# Patient Record
Sex: MALE | Race: WHITE | NOT HISPANIC OR LATINO
[De-identification: names, ages, dates, MRNs, and addresses within clinical notes are randomized per-mention and may not be internally consistent; named-entity substitution may affect disease eponyms.]

---

## 2018-01-31 PROBLEM — Z00.00 ENCOUNTER FOR PREVENTIVE HEALTH EXAMINATION: Status: ACTIVE | Noted: 2018-01-31

## 2018-02-05 ENCOUNTER — APPOINTMENT (OUTPATIENT)
Dept: CT IMAGING | Facility: CLINIC | Age: 58
End: 2018-02-05
Payer: COMMERCIAL

## 2018-02-05 ENCOUNTER — OUTPATIENT (OUTPATIENT)
Dept: OUTPATIENT SERVICES | Facility: HOSPITAL | Age: 58
LOS: 1 days | End: 2018-02-05
Payer: COMMERCIAL

## 2018-02-05 DIAGNOSIS — Z00.8 ENCOUNTER FOR OTHER GENERAL EXAMINATION: ICD-10-CM

## 2018-02-05 PROCEDURE — 71260 CT THORAX DX C+: CPT

## 2018-02-05 PROCEDURE — 82565 ASSAY OF CREATININE: CPT

## 2018-02-05 PROCEDURE — 74177 CT ABD & PELVIS W/CONTRAST: CPT | Mod: 26

## 2018-02-05 PROCEDURE — 71260 CT THORAX DX C+: CPT | Mod: 26

## 2018-02-05 PROCEDURE — 74177 CT ABD & PELVIS W/CONTRAST: CPT

## 2022-10-08 ENCOUNTER — INPATIENT (INPATIENT)
Facility: HOSPITAL | Age: 62
LOS: 4 days | Discharge: HOME | DRG: 682 | End: 2022-10-13
Attending: INTERNAL MEDICINE | Admitting: INTERNAL MEDICINE
Payer: COMMERCIAL

## 2022-10-08 VITALS
RESPIRATION RATE: 18 BRPM | SYSTOLIC BLOOD PRESSURE: 106 MMHG | HEART RATE: 99 BPM | HEIGHT: 66 IN | TEMPERATURE: 97 F | WEIGHT: 162.92 LBS | DIASTOLIC BLOOD PRESSURE: 67 MMHG | OXYGEN SATURATION: 93 %

## 2022-10-08 DIAGNOSIS — N17.9 ACUTE KIDNEY FAILURE, UNSPECIFIED: ICD-10-CM

## 2022-10-08 LAB
ACETONE SERPL-MCNC: NEGATIVE — SIGNIFICANT CHANGE UP
ALBUMIN SERPL ELPH-MCNC: 2.3 G/DL — LOW (ref 3.3–5)
ALBUMIN SERPL ELPH-MCNC: 2.7 G/DL — LOW (ref 3.3–5)
ALP SERPL-CCNC: 68 U/L — SIGNIFICANT CHANGE UP (ref 40–120)
ALP SERPL-CCNC: 77 U/L — SIGNIFICANT CHANGE UP (ref 40–120)
ALT FLD-CCNC: 81 U/L — HIGH (ref 12–78)
ALT FLD-CCNC: 84 U/L — HIGH (ref 12–78)
ANION GAP SERPL CALC-SCNC: 10 MMOL/L — SIGNIFICANT CHANGE UP (ref 5–17)
ANION GAP SERPL CALC-SCNC: 10 MMOL/L — SIGNIFICANT CHANGE UP (ref 5–17)
ANION GAP SERPL CALC-SCNC: 14 MMOL/L — SIGNIFICANT CHANGE UP (ref 5–17)
APPEARANCE UR: CLEAR — SIGNIFICANT CHANGE UP
AST SERPL-CCNC: 56 U/L — HIGH (ref 15–37)
AST SERPL-CCNC: 65 U/L — HIGH (ref 15–37)
BASOPHILS # BLD AUTO: 0.03 K/UL — SIGNIFICANT CHANGE UP (ref 0–0.2)
BASOPHILS NFR BLD AUTO: 0.4 % — SIGNIFICANT CHANGE UP (ref 0–2)
BILIRUB DIRECT SERPL-MCNC: 0.2 MG/DL — SIGNIFICANT CHANGE UP (ref 0–0.3)
BILIRUB INDIRECT FLD-MCNC: 0.4 MG/DL — SIGNIFICANT CHANGE UP (ref 0.2–1)
BILIRUB SERPL-MCNC: 0.6 MG/DL — SIGNIFICANT CHANGE UP (ref 0.2–1.2)
BILIRUB SERPL-MCNC: 0.7 MG/DL — SIGNIFICANT CHANGE UP (ref 0.2–1.2)
BILIRUB UR-MCNC: NEGATIVE — SIGNIFICANT CHANGE UP
BUN SERPL-MCNC: 65 MG/DL — HIGH (ref 7–23)
BUN SERPL-MCNC: 65 MG/DL — HIGH (ref 7–23)
BUN SERPL-MCNC: 70 MG/DL — HIGH (ref 7–23)
CALCIUM SERPL-MCNC: 8.9 MG/DL — SIGNIFICANT CHANGE UP (ref 8.5–10.1)
CALCIUM SERPL-MCNC: 9 MG/DL — SIGNIFICANT CHANGE UP (ref 8.5–10.1)
CALCIUM SERPL-MCNC: 9.7 MG/DL — SIGNIFICANT CHANGE UP (ref 8.5–10.1)
CHLORIDE SERPL-SCNC: 103 MMOL/L — SIGNIFICANT CHANGE UP (ref 96–108)
CHLORIDE SERPL-SCNC: 104 MMOL/L — SIGNIFICANT CHANGE UP (ref 96–108)
CHLORIDE SERPL-SCNC: 95 MMOL/L — LOW (ref 96–108)
CO2 SERPL-SCNC: 21 MMOL/L — LOW (ref 22–31)
CO2 SERPL-SCNC: 21 MMOL/L — LOW (ref 22–31)
CO2 SERPL-SCNC: 22 MMOL/L — SIGNIFICANT CHANGE UP (ref 22–31)
COLOR SPEC: YELLOW — SIGNIFICANT CHANGE UP
CREAT SERPL-MCNC: 3.3 MG/DL — HIGH (ref 0.5–1.3)
CREAT SERPL-MCNC: 3.4 MG/DL — HIGH (ref 0.5–1.3)
CREAT SERPL-MCNC: 3.9 MG/DL — HIGH (ref 0.5–1.3)
DIFF PNL FLD: ABNORMAL
EGFR: 17 ML/MIN/1.73M2 — LOW
EGFR: 20 ML/MIN/1.73M2 — LOW
EGFR: 21 ML/MIN/1.73M2 — LOW
EOSINOPHIL # BLD AUTO: 0.07 K/UL — SIGNIFICANT CHANGE UP (ref 0–0.5)
EOSINOPHIL NFR BLD AUTO: 1 % — SIGNIFICANT CHANGE UP (ref 0–6)
ETHANOL SERPL-MCNC: <10 MG/DL — SIGNIFICANT CHANGE UP (ref 0–10)
GLUCOSE SERPL-MCNC: 227 MG/DL — HIGH (ref 70–99)
GLUCOSE SERPL-MCNC: 229 MG/DL — HIGH (ref 70–99)
GLUCOSE SERPL-MCNC: 328 MG/DL — HIGH (ref 70–99)
GLUCOSE UR QL: 1000 MG/DL
HCT VFR BLD CALC: 39.9 % — SIGNIFICANT CHANGE UP (ref 39–50)
HGB BLD-MCNC: 13.5 G/DL — SIGNIFICANT CHANGE UP (ref 13–17)
IMM GRANULOCYTES NFR BLD AUTO: 0.6 % — SIGNIFICANT CHANGE UP (ref 0–0.9)
KETONES UR-MCNC: NEGATIVE — SIGNIFICANT CHANGE UP
LACTATE SERPL-SCNC: 1.5 MMOL/L — SIGNIFICANT CHANGE UP (ref 0.7–2)
LEUKOCYTE ESTERASE UR-ACNC: NEGATIVE — SIGNIFICANT CHANGE UP
LYMPHOCYTES # BLD AUTO: 0.5 K/UL — LOW (ref 1–3.3)
LYMPHOCYTES # BLD AUTO: 7.2 % — LOW (ref 13–44)
MAGNESIUM SERPL-MCNC: 2.2 MG/DL — SIGNIFICANT CHANGE UP (ref 1.6–2.6)
MCHC RBC-ENTMCNC: 29.8 PG — SIGNIFICANT CHANGE UP (ref 27–34)
MCHC RBC-ENTMCNC: 33.8 GM/DL — SIGNIFICANT CHANGE UP (ref 32–36)
MCV RBC AUTO: 88.1 FL — SIGNIFICANT CHANGE UP (ref 80–100)
MONOCYTES # BLD AUTO: 1.04 K/UL — HIGH (ref 0–0.9)
MONOCYTES NFR BLD AUTO: 15 % — HIGH (ref 2–14)
NEUTROPHILS # BLD AUTO: 5.25 K/UL — SIGNIFICANT CHANGE UP (ref 1.8–7.4)
NEUTROPHILS NFR BLD AUTO: 75.8 % — SIGNIFICANT CHANGE UP (ref 43–77)
NITRITE UR-MCNC: NEGATIVE — SIGNIFICANT CHANGE UP
NRBC # BLD: 0 /100 WBCS — SIGNIFICANT CHANGE UP (ref 0–0)
PH UR: 5 — SIGNIFICANT CHANGE UP (ref 5–8)
PHOSPHATE SERPL-MCNC: 3.1 MG/DL — SIGNIFICANT CHANGE UP (ref 2.5–4.5)
PLATELET # BLD AUTO: 263 K/UL — SIGNIFICANT CHANGE UP (ref 150–400)
POTASSIUM SERPL-MCNC: 3.9 MMOL/L — SIGNIFICANT CHANGE UP (ref 3.5–5.3)
POTASSIUM SERPL-MCNC: 4.4 MMOL/L — SIGNIFICANT CHANGE UP (ref 3.5–5.3)
POTASSIUM SERPL-MCNC: 4.5 MMOL/L — SIGNIFICANT CHANGE UP (ref 3.5–5.3)
POTASSIUM SERPL-SCNC: 3.9 MMOL/L — SIGNIFICANT CHANGE UP (ref 3.5–5.3)
POTASSIUM SERPL-SCNC: 4.4 MMOL/L — SIGNIFICANT CHANGE UP (ref 3.5–5.3)
POTASSIUM SERPL-SCNC: 4.5 MMOL/L — SIGNIFICANT CHANGE UP (ref 3.5–5.3)
PROT SERPL-MCNC: 6.5 G/DL — SIGNIFICANT CHANGE UP (ref 6–8.3)
PROT SERPL-MCNC: 7.8 G/DL — SIGNIFICANT CHANGE UP (ref 6–8.3)
PROT UR-MCNC: 100
RBC # BLD: 4.53 M/UL — SIGNIFICANT CHANGE UP (ref 4.2–5.8)
RBC # FLD: 13.6 % — SIGNIFICANT CHANGE UP (ref 10.3–14.5)
SARS-COV-2 RNA SPEC QL NAA+PROBE: SIGNIFICANT CHANGE UP
SODIUM SERPL-SCNC: 131 MMOL/L — LOW (ref 135–145)
SODIUM SERPL-SCNC: 134 MMOL/L — LOW (ref 135–145)
SODIUM SERPL-SCNC: 135 MMOL/L — SIGNIFICANT CHANGE UP (ref 135–145)
SP GR SPEC: 1.01 — SIGNIFICANT CHANGE UP (ref 1.01–1.02)
UROBILINOGEN FLD QL: NEGATIVE — SIGNIFICANT CHANGE UP
WBC # BLD: 6.93 K/UL — SIGNIFICANT CHANGE UP (ref 3.8–10.5)
WBC # FLD AUTO: 6.93 K/UL — SIGNIFICANT CHANGE UP (ref 3.8–10.5)

## 2022-10-08 PROCEDURE — 93010 ELECTROCARDIOGRAM REPORT: CPT

## 2022-10-08 PROCEDURE — 74176 CT ABD & PELVIS W/O CONTRAST: CPT | Mod: 26,MA

## 2022-10-08 PROCEDURE — 71250 CT THORAX DX C-: CPT | Mod: 26

## 2022-10-08 PROCEDURE — 99285 EMERGENCY DEPT VISIT HI MDM: CPT

## 2022-10-08 PROCEDURE — 70450 CT HEAD/BRAIN W/O DYE: CPT | Mod: 26,MA

## 2022-10-08 PROCEDURE — 71045 X-RAY EXAM CHEST 1 VIEW: CPT | Mod: 26

## 2022-10-08 RX ORDER — THIAMINE MONONITRATE (VIT B1) 100 MG
100 TABLET ORAL DAILY
Refills: 0 | Status: COMPLETED | OUTPATIENT
Start: 2022-10-08 | End: 2022-10-11

## 2022-10-08 RX ORDER — PIPERACILLIN AND TAZOBACTAM 4; .5 G/20ML; G/20ML
3.38 INJECTION, POWDER, LYOPHILIZED, FOR SOLUTION INTRAVENOUS EVERY 12 HOURS
Refills: 0 | Status: DISCONTINUED | OUTPATIENT
Start: 2022-10-08 | End: 2022-10-10

## 2022-10-08 RX ORDER — HEPARIN SODIUM 5000 [USP'U]/ML
5000 INJECTION INTRAVENOUS; SUBCUTANEOUS EVERY 8 HOURS
Refills: 0 | Status: DISCONTINUED | OUTPATIENT
Start: 2022-10-08 | End: 2022-10-13

## 2022-10-08 RX ORDER — SODIUM CHLORIDE 9 MG/ML
1000 INJECTION, SOLUTION INTRAVENOUS
Refills: 0 | Status: DISCONTINUED | OUTPATIENT
Start: 2022-10-08 | End: 2022-10-13

## 2022-10-08 RX ORDER — GLUCAGON INJECTION, SOLUTION 0.5 MG/.1ML
1 INJECTION, SOLUTION SUBCUTANEOUS ONCE
Refills: 0 | Status: DISCONTINUED | OUTPATIENT
Start: 2022-10-08 | End: 2022-10-13

## 2022-10-08 RX ORDER — METOPROLOL TARTRATE 50 MG
25 TABLET ORAL
Refills: 0 | Status: DISCONTINUED | OUTPATIENT
Start: 2022-10-08 | End: 2022-10-13

## 2022-10-08 RX ORDER — FOLIC ACID 0.8 MG
1 TABLET ORAL DAILY
Refills: 0 | Status: DISCONTINUED | OUTPATIENT
Start: 2022-10-08 | End: 2022-10-13

## 2022-10-08 RX ORDER — SODIUM CHLORIDE 9 MG/ML
1000 INJECTION INTRAMUSCULAR; INTRAVENOUS; SUBCUTANEOUS ONCE
Refills: 0 | Status: COMPLETED | OUTPATIENT
Start: 2022-10-08 | End: 2022-10-08

## 2022-10-08 RX ORDER — AMLODIPINE BESYLATE 2.5 MG/1
5 TABLET ORAL DAILY
Refills: 0 | Status: DISCONTINUED | OUTPATIENT
Start: 2022-10-08 | End: 2022-10-13

## 2022-10-08 RX ORDER — SODIUM CHLORIDE 9 MG/ML
1000 INJECTION INTRAMUSCULAR; INTRAVENOUS; SUBCUTANEOUS
Refills: 0 | Status: DISCONTINUED | OUTPATIENT
Start: 2022-10-08 | End: 2022-10-11

## 2022-10-08 RX ORDER — INSULIN GLARGINE 100 [IU]/ML
10 INJECTION, SOLUTION SUBCUTANEOUS ONCE
Refills: 0 | Status: COMPLETED | OUTPATIENT
Start: 2022-10-08 | End: 2022-10-08

## 2022-10-08 RX ORDER — DEXTROSE 50 % IN WATER 50 %
12.5 SYRINGE (ML) INTRAVENOUS ONCE
Refills: 0 | Status: DISCONTINUED | OUTPATIENT
Start: 2022-10-08 | End: 2022-10-13

## 2022-10-08 RX ORDER — DEXTROSE 50 % IN WATER 50 %
25 SYRINGE (ML) INTRAVENOUS ONCE
Refills: 0 | Status: DISCONTINUED | OUTPATIENT
Start: 2022-10-08 | End: 2022-10-13

## 2022-10-08 RX ORDER — INSULIN LISPRO 100/ML
VIAL (ML) SUBCUTANEOUS
Refills: 0 | Status: DISCONTINUED | OUTPATIENT
Start: 2022-10-08 | End: 2022-10-11

## 2022-10-08 RX ORDER — DEXTROSE 50 % IN WATER 50 %
15 SYRINGE (ML) INTRAVENOUS ONCE
Refills: 0 | Status: DISCONTINUED | OUTPATIENT
Start: 2022-10-08 | End: 2022-10-13

## 2022-10-08 RX ORDER — ATORVASTATIN CALCIUM 80 MG/1
20 TABLET, FILM COATED ORAL AT BEDTIME
Refills: 0 | Status: DISCONTINUED | OUTPATIENT
Start: 2022-10-08 | End: 2022-10-13

## 2022-10-08 RX ORDER — PIPERACILLIN AND TAZOBACTAM 4; .5 G/20ML; G/20ML
3.38 INJECTION, POWDER, LYOPHILIZED, FOR SOLUTION INTRAVENOUS ONCE
Refills: 0 | Status: COMPLETED | OUTPATIENT
Start: 2022-10-08 | End: 2022-10-08

## 2022-10-08 RX ADMIN — ATORVASTATIN CALCIUM 20 MILLIGRAM(S): 80 TABLET, FILM COATED ORAL at 22:54

## 2022-10-08 RX ADMIN — INSULIN GLARGINE 10 UNIT(S): 100 INJECTION, SOLUTION SUBCUTANEOUS at 22:54

## 2022-10-08 RX ADMIN — PIPERACILLIN AND TAZOBACTAM 200 GRAM(S): 4; .5 INJECTION, POWDER, LYOPHILIZED, FOR SOLUTION INTRAVENOUS at 19:40

## 2022-10-08 RX ADMIN — SODIUM CHLORIDE 1000 MILLILITER(S): 9 INJECTION INTRAMUSCULAR; INTRAVENOUS; SUBCUTANEOUS at 12:32

## 2022-10-08 RX ADMIN — HEPARIN SODIUM 5000 UNIT(S): 5000 INJECTION INTRAVENOUS; SUBCUTANEOUS at 22:54

## 2022-10-08 RX ADMIN — SODIUM CHLORIDE 1000 MILLILITER(S): 9 INJECTION INTRAMUSCULAR; INTRAVENOUS; SUBCUTANEOUS at 13:32

## 2022-10-08 RX ADMIN — Medication 2: at 22:54

## 2022-10-08 NOTE — ED PROVIDER NOTE - OBJECTIVE STATEMENT
Pt is a 62-year-old male with past medical hx of diabetes hypertension hyperlipidemia here for elevated blood sugar for the last few days.  Wife states patient also has been more confused but only at night.  Patient also noted to have shaking and tremors throughout the day.  Patient denies any cough fever chills numbness tingling or weakness no trauma or falls.  Of note patient quit drinking wine 1 week ago usually drinks about 3 to 4 glasses of homemade wine.  Patient denies any chest pain shortness of breath nausea vomiting.  Patient states had diarrhea few days ago now resolved.

## 2022-10-08 NOTE — H&P ADULT - HISTORY OF PRESENT ILLNESS
62y Male complaining of altered mental status. 62-year-old male with past medical hx of diabetes hypertension hyperlipidemia here for elevated blood sugar for the last few days.  Wife states patient also has been more confused but only at night.  Patient also noted to have shaking and tremors throughout the day.  Patient denies any cough fever chills numbness tingling or weakness no trauma or falls.  Of note patient quit drinking wine 1 week ago usually drinks about 3 to 4 glasses of homemade wine.  Patient denies any chest pain shortness of breath nausea vomiting.  Patient states had diarrhea few days ago now resolved. 62-year-old male with past medical hx of T2 diabetes hypertension hyperlipidemia here for elevated blood sugar for the last few days.  Wife states patient also has been more confused but only at night.  Patient also noted to have shaking and tremors throughout the day.  Patient denies any cough fever chills numbness tingling or weakness no trauma or falls.  Of note patient quit drinking wine 1 week ago usually drinks about 3 to 4 glasses of homemade wine.  Patient denies any chest pain shortness of breath nausea vomiting.  Patient states had diarrhea few days ago now resolved.

## 2022-10-08 NOTE — ED PROVIDER NOTE - NS ED ATTENDING STATEMENT MOD
This was a shared visit with the RICK. I reviewed and verified the documentation and independently performed the documented:

## 2022-10-08 NOTE — CONSULT NOTE ADULT - ASSESSMENT
[facility]  Chart Reviewed-Full Consult to follow for any immediate concerns please fell free to contact us directly at  [phone] and have us paged or text my cell # [phone]  Daron Bañuelos MD PhD

## 2022-10-08 NOTE — H&P ADULT - NSHPLABSRESULTS_GEN_ALL_CORE
Lab Results:  CBC  CBC Full  -  ( 08 Oct 2022 12:25 )  WBC Count : 6.93 K/uL  RBC Count : 4.53 M/uL  Hemoglobin : 13.5 g/dL  Hematocrit : 39.9 %  Platelet Count - Automated : 263 K/uL  Mean Cell Volume : 88.1 fl  Mean Cell Hemoglobin : 29.8 pg  Mean Cell Hemoglobin Concentration : 33.8 gm/dL  Auto Neutrophil # : 5.25 K/uL  Auto Lymphocyte # : 0.50 K/uL  Auto Monocyte # : 1.04 K/uL  Auto Eosinophil # : 0.07 K/uL  Auto Basophil # : 0.03 K/uL  Auto Neutrophil % : 75.8 %  Auto Lymphocyte % : 7.2 %  Auto Monocyte % : 15.0 %  Auto Eosinophil % : 1.0 %  Auto Basophil % : 0.4 %    .		Differential:	[] Automated		[] Manual  Chemistry                        13.5   6.93  )-----------( 263      ( 08 Oct 2022 12:25 )             39.9     10-08    131<L>  |  95<L>  |  70<H>  ----------------------------<  328<H>  3.9   |  22  |  3.90<H>    Ca    9.7      08 Oct 2022 12:25    TPro  7.8  /  Alb  2.7<L>  /  TBili  0.7  /  DBili  x   /  AST  56<H>  /  ALT  84<H>  /  AlkPhos  77  10-08    LIVER FUNCTIONS - ( 08 Oct 2022 12:25 )  Alb: 2.7 g/dL / Pro: 7.8 g/dL / ALK PHOS: 77 U/L / ALT: 84 U/L / AST: 56 U/L / GGT: x             Urinalysis Basic - ( 08 Oct 2022 15:00 )    Color: Yellow / Appearance: Clear / S.010 / pH: x  Gluc: x / Ketone: Negative  / Bili: Negative / Urobili: Negative   Blood: x / Protein: 100 / Nitrite: Negative   Leuk Esterase: Negative / RBC: 3-5 /HPF / WBC 0-2   Sq Epi: x / Non Sq Epi: Occasional / Bacteria: x            MICROBIOLOGY/CULTURES:      RADIOLOGY RESULTS: reviewed

## 2022-10-08 NOTE — H&P ADULT - ASSESSMENT
62-year-old male with past medical hx of diabetes hypertension hyperlipidemia here for elevated blood sugar for the last few days.  Wife states patient also has been more confused but only at night.  Patient also noted to have shaking and tremors throughout the day.  Patient denies any cough fever chills numbness tingling or weakness no trauma or falls.  Of note patient quit drinking wine 1 week ago usually drinks about 3 to 4 glasses of homemade wine.  Patient denies any chest pain shortness of breath nausea vomiting.  Patient states had diarrhea few days ago now resolved.    1 PNA  - cw abx  - id fu   - will monitor    2 Uncontrolled dm  - ro DKA  - endo fu pending   - monitor FS  - ISS    3 HTN  - cw home meds  - dash diet    4 HLD  - cw statin    5 Alcohol abuse  - stopped drinking  - [facility] protocol  - low risk of withdrawal

## 2022-10-08 NOTE — H&P ADULT - NSHPPHYSICALEXAM_GEN_ALL_CORE
General: WN/WD NAD  PERRLA  Neurology: A&Ox3, nonfocal, MOROCHO x 4  Respiratory: CTA B/L  CV: RRR, S1S2, no murmurs, rubs or gallops  Abdominal: Soft, NT, ND +BS, Last BM  Extremities: No edema, + peripheral pulses  Skin Normal

## 2022-10-08 NOTE — ED ADULT NURSE NOTE - OBJECTIVE STATEMENT
Presents to ER with AMS x 1 week.  Wife at bedside stating pt has been confused and hallucinating at night time. Rectal temp 99. Wife states pt drinks ETOH daily but has recently stopped bc he wasn't feeling well.

## 2022-10-08 NOTE — ED ADULT TRIAGE NOTE - CHIEF COMPLAINT QUOTE
Patient having altered mental status since last week only at nighttime. Per family members, patient had diarrhea x3 days last week which has now subsided but now patient c.o lower back pain.

## 2022-10-08 NOTE — ED ADULT NURSE NOTE - NURSING NEURO LEVEL OF CONSCIOUSNESS
alert and awake Rhofade Counseling: Rhofade is a topical medication which can decrease superficial blood flow where applied. Side effects are uncommon and include stinging, redness and allergic reactions.

## 2022-10-08 NOTE — ED ADULT NURSE NOTE - SCORE
A (Catheter 5fr Radl Tig 4 Crv 110cm Lg Lum Sh 2) catheter was used to cross the aortic valve and placed in the left ventricle.  LV pressure was recorded and measured.  1

## 2022-10-08 NOTE — ED PROVIDER NOTE - CLINICAL SUMMARY MEDICAL DECISION MAKING FREE TEXT BOX
I, True Mckee MD, have seen and examined the patient on the date of service.  I agree with the RICK's assessment as written, with exceptions or additions as noted below or in a separate note. Patient with past medical history of alcohol use is presenting with concern for further mental status.  Wife has noticed that since Tuesday that he has been having change in mental status at nighttime but improves over the course of the day.  Of note he has a few glasses of wine daily, however stopped drinking on Monday.  Has not been having any increased shaking episodes or reported seizures.  At this time patient found to be hyperglycemic but otherwise had normal mental status per discussion with wife at bedside.  No focal findings on exam.  Concern for possible alcohol withdrawal, though with symptoms only occurring at night and not worsening, less likely that.  Will check for underlying electrolyte abnormality versus possible DKA.  No fever to suggest underlying infection.  plan to check labs/imaging.

## 2022-10-09 DIAGNOSIS — E11.65 TYPE 2 DIABETES MELLITUS WITH HYPERGLYCEMIA: ICD-10-CM

## 2022-10-09 LAB
A1C WITH ESTIMATED AVERAGE GLUCOSE RESULT: 8.8 % — HIGH (ref 4–5.6)
ALBUMIN SERPL ELPH-MCNC: 2.3 G/DL — LOW (ref 3.3–5)
ALP SERPL-CCNC: 71 U/L — SIGNIFICANT CHANGE UP (ref 40–120)
ALT FLD-CCNC: 89 U/L — HIGH (ref 12–78)
ANION GAP SERPL CALC-SCNC: 10 MMOL/L — SIGNIFICANT CHANGE UP (ref 5–17)
ANION GAP SERPL CALC-SCNC: 10 MMOL/L — SIGNIFICANT CHANGE UP (ref 5–17)
AST SERPL-CCNC: 72 U/L — HIGH (ref 15–37)
B-OH-BUTYR SERPL-SCNC: 0.6 MMOL/L — HIGH
BASE EXCESS BLDV CALC-SCNC: -4.5 MMOL/L — LOW (ref -2–3)
BILIRUB SERPL-MCNC: 0.7 MG/DL — SIGNIFICANT CHANGE UP (ref 0.2–1.2)
BLOOD GAS COMMENTS, VENOUS: SIGNIFICANT CHANGE UP
BUN SERPL-MCNC: 62 MG/DL — HIGH (ref 7–23)
BUN SERPL-MCNC: 65 MG/DL — HIGH (ref 7–23)
CALCIUM SERPL-MCNC: 9 MG/DL — SIGNIFICANT CHANGE UP (ref 8.5–10.1)
CALCIUM SERPL-MCNC: 9.2 MG/DL — SIGNIFICANT CHANGE UP (ref 8.5–10.1)
CHLORIDE SERPL-SCNC: 104 MMOL/L — SIGNIFICANT CHANGE UP (ref 96–108)
CHLORIDE SERPL-SCNC: 104 MMOL/L — SIGNIFICANT CHANGE UP (ref 96–108)
CO2 SERPL-SCNC: 23 MMOL/L — SIGNIFICANT CHANGE UP (ref 22–31)
CO2 SERPL-SCNC: 23 MMOL/L — SIGNIFICANT CHANGE UP (ref 22–31)
CREAT SERPL-MCNC: 3.4 MG/DL — HIGH (ref 0.5–1.3)
CREAT SERPL-MCNC: 3.4 MG/DL — HIGH (ref 0.5–1.3)
CULTURE RESULTS: SIGNIFICANT CHANGE UP
EGFR: 20 ML/MIN/1.73M2 — LOW
EGFR: 20 ML/MIN/1.73M2 — LOW
ESTIMATED AVERAGE GLUCOSE: 206 MG/DL — HIGH (ref 68–114)
GLUCOSE SERPL-MCNC: 181 MG/DL — HIGH (ref 70–99)
GLUCOSE SERPL-MCNC: 193 MG/DL — HIGH (ref 70–99)
HCO3 BLDV-SCNC: 22 MMOL/L — SIGNIFICANT CHANGE UP (ref 22–29)
HCT VFR BLD CALC: 36.2 % — LOW (ref 39–50)
HCV AB S/CO SERPL IA: 0.08 S/CO — SIGNIFICANT CHANGE UP (ref 0–0.99)
HCV AB SERPL-IMP: SIGNIFICANT CHANGE UP
HGB BLD-MCNC: 12.1 G/DL — LOW (ref 13–17)
MAGNESIUM SERPL-MCNC: 2.3 MG/DL — SIGNIFICANT CHANGE UP (ref 1.6–2.6)
MCHC RBC-ENTMCNC: 29.9 PG — SIGNIFICANT CHANGE UP (ref 27–34)
MCHC RBC-ENTMCNC: 33.4 GM/DL — SIGNIFICANT CHANGE UP (ref 32–36)
MCV RBC AUTO: 89.4 FL — SIGNIFICANT CHANGE UP (ref 80–100)
NRBC # BLD: 0 /100 WBCS — SIGNIFICANT CHANGE UP (ref 0–0)
PCO2 BLDV: 42 MMHG — SIGNIFICANT CHANGE UP (ref 42–55)
PH BLDV: 7.32 — SIGNIFICANT CHANGE UP (ref 7.32–7.43)
PHOSPHATE SERPL-MCNC: 3 MG/DL — SIGNIFICANT CHANGE UP (ref 2.5–4.5)
PLATELET # BLD AUTO: 282 K/UL — SIGNIFICANT CHANGE UP (ref 150–400)
PO2 BLDV: 53 MMHG — HIGH (ref 25–45)
POTASSIUM SERPL-MCNC: 4.4 MMOL/L — SIGNIFICANT CHANGE UP (ref 3.5–5.3)
POTASSIUM SERPL-MCNC: 4.4 MMOL/L — SIGNIFICANT CHANGE UP (ref 3.5–5.3)
POTASSIUM SERPL-SCNC: 4.4 MMOL/L — SIGNIFICANT CHANGE UP (ref 3.5–5.3)
POTASSIUM SERPL-SCNC: 4.4 MMOL/L — SIGNIFICANT CHANGE UP (ref 3.5–5.3)
PROT SERPL-MCNC: 6.6 G/DL — SIGNIFICANT CHANGE UP (ref 6–8.3)
RBC # BLD: 4.05 M/UL — LOW (ref 4.2–5.8)
RBC # FLD: 13.7 % — SIGNIFICANT CHANGE UP (ref 10.3–14.5)
SAO2 % BLDV: 85.3 % — SIGNIFICANT CHANGE UP (ref 67–88)
SODIUM SERPL-SCNC: 137 MMOL/L — SIGNIFICANT CHANGE UP (ref 135–145)
SODIUM SERPL-SCNC: 137 MMOL/L — SIGNIFICANT CHANGE UP (ref 135–145)
SPECIMEN SOURCE: SIGNIFICANT CHANGE UP
WBC # BLD: 6.6 K/UL — SIGNIFICANT CHANGE UP (ref 3.8–10.5)
WBC # FLD AUTO: 6.6 K/UL — SIGNIFICANT CHANGE UP (ref 3.8–10.5)

## 2022-10-09 PROCEDURE — 76770 US EXAM ABDO BACK WALL COMP: CPT | Mod: 26

## 2022-10-09 RX ORDER — INSULIN LISPRO 100/ML
VIAL (ML) SUBCUTANEOUS
Refills: 0 | Status: DISCONTINUED | OUTPATIENT
Start: 2022-10-09 | End: 2022-10-11

## 2022-10-09 RX ORDER — INSULIN GLARGINE 100 [IU]/ML
10 INJECTION, SOLUTION SUBCUTANEOUS AT BEDTIME
Refills: 0 | Status: DISCONTINUED | OUTPATIENT
Start: 2022-10-09 | End: 2022-10-13

## 2022-10-09 RX ORDER — INSULIN LISPRO 100/ML
VIAL (ML) SUBCUTANEOUS AT BEDTIME
Refills: 0 | Status: DISCONTINUED | OUTPATIENT
Start: 2022-10-09 | End: 2022-10-13

## 2022-10-09 RX ORDER — SODIUM CHLORIDE 9 MG/ML
1000 INJECTION, SOLUTION INTRAVENOUS
Refills: 0 | Status: DISCONTINUED | OUTPATIENT
Start: 2022-10-09 | End: 2022-10-13

## 2022-10-09 RX ORDER — INSULIN LISPRO 100/ML
3 VIAL (ML) SUBCUTANEOUS
Refills: 0 | Status: DISCONTINUED | OUTPATIENT
Start: 2022-10-09 | End: 2022-10-13

## 2022-10-09 RX ORDER — ALBUTEROL 90 UG/1
2 AEROSOL, METERED ORAL EVERY 6 HOURS
Refills: 0 | Status: DISCONTINUED | OUTPATIENT
Start: 2022-10-09 | End: 2022-10-13

## 2022-10-09 RX ADMIN — HEPARIN SODIUM 5000 UNIT(S): 5000 INJECTION INTRAVENOUS; SUBCUTANEOUS at 14:18

## 2022-10-09 RX ADMIN — Medication 100 MILLIGRAM(S): at 12:51

## 2022-10-09 RX ADMIN — Medication 3 UNIT(S): at 19:37

## 2022-10-09 RX ADMIN — SODIUM CHLORIDE 75 MILLILITER(S): 9 INJECTION INTRAMUSCULAR; INTRAVENOUS; SUBCUTANEOUS at 00:41

## 2022-10-09 RX ADMIN — Medication 2: at 12:55

## 2022-10-09 RX ADMIN — HEPARIN SODIUM 5000 UNIT(S): 5000 INJECTION INTRAVENOUS; SUBCUTANEOUS at 22:12

## 2022-10-09 RX ADMIN — PIPERACILLIN AND TAZOBACTAM 25 GRAM(S): 4; .5 INJECTION, POWDER, LYOPHILIZED, FOR SOLUTION INTRAVENOUS at 03:45

## 2022-10-09 RX ADMIN — Medication 1: at 06:45

## 2022-10-09 RX ADMIN — HEPARIN SODIUM 5000 UNIT(S): 5000 INJECTION INTRAVENOUS; SUBCUTANEOUS at 06:44

## 2022-10-09 RX ADMIN — ATORVASTATIN CALCIUM 20 MILLIGRAM(S): 80 TABLET, FILM COATED ORAL at 22:13

## 2022-10-09 RX ADMIN — INSULIN GLARGINE 10 UNIT(S): 100 INJECTION, SOLUTION SUBCUTANEOUS at 22:13

## 2022-10-09 RX ADMIN — AMLODIPINE BESYLATE 5 MILLIGRAM(S): 2.5 TABLET ORAL at 06:44

## 2022-10-09 RX ADMIN — Medication 25 MILLIGRAM(S): at 06:45

## 2022-10-09 RX ADMIN — Medication 2: at 19:37

## 2022-10-09 RX ADMIN — PIPERACILLIN AND TAZOBACTAM 25 GRAM(S): 4; .5 INJECTION, POWDER, LYOPHILIZED, FOR SOLUTION INTRAVENOUS at 14:18

## 2022-10-09 RX ADMIN — Medication 1 MILLIGRAM(S): at 14:14

## 2022-10-09 RX ADMIN — Medication 25 MILLIGRAM(S): at 19:38

## 2022-10-09 RX ADMIN — Medication 1 TABLET(S): at 12:51

## 2022-10-09 NOTE — CONSULT NOTE ADULT - PROBLEM SELECTOR RECOMMENDATION 9
cont lantus 10 units qhs  change mod dose admelog corrective scale coverage qac/qhs  add admelog 3 units 3x/day before meals  cont cons cho diet  goal bg 100-180 in hosp setting

## 2022-10-09 NOTE — PROGRESS NOTE ADULT - SUBJECTIVE AND OBJECTIVE BOX
Patient is a 62y old  Male who presents with a chief complaint of AMS (09 Oct 2022 11:15)    Date of servie : 10-09-22 @ 12:34  INTERVAL HPI/OVERNIGHT EVENTS:  T(C): 36.4 (10-09-22 @ 12:04), Max: 36.8 (10-08-22 @ 15:54)  HR: 76 (10-09-22 @ 12:04) (76 - 88)  BP: 122/69 (10-09-22 @ 12:04) (122/69 - 135/75)  RR: 16 (10-09-22 @ 12:04) (16 - 18)  SpO2: 94% (10-09-22 @ 12:04) (93% - 96%)  Wt(kg): --  I&O's Summary      LABS:                        12.1   6.60  )-----------( 282      ( 09 Oct 2022 04:49 )             36.2     10-09    137  |  104  |  62<H>  ----------------------------<  181<H>  4.4   |  23  |  3.40<H>    Ca    9.0      09 Oct 2022 04:49  Phos  3.0     10-09  Mg     2.3     10-09    TPro  6.6  /  Alb  2.3<L>  /  TBili  0.7  /  DBili  x   /  AST  72<H>  /  ALT  89<H>  /  AlkPhos  71  10      Urinalysis Basic - ( 08 Oct 2022 15:00 )    Color: Yellow / Appearance: Clear / S.010 / pH: x  Gluc: x / Ketone: Negative  / Bili: Negative / Urobili: Negative   Blood: x / Protein: 100 / Nitrite: Negative   Leuk Esterase: Negative / RBC: 3-5 /HPF / WBC 0-2   Sq Epi: x / Non Sq Epi: Occasional / Bacteria: x      CAPILLARY BLOOD GLUCOSE      POCT Blood Glucose.: 212 mg/dL (09 Oct 2022 11:01)  POCT Blood Glucose.: 180 mg/dL (09 Oct 2022 06:08)  POCT Blood Glucose.: 223 mg/dL (08 Oct 2022 22:20)        Urinalysis Basic - ( 08 Oct 2022 15:00 )    Color: Yellow / Appearance: Clear / S.010 / pH: x  Gluc: x / Ketone: Negative  / Bili: Negative / Urobili: Negative   Blood: x / Protein: 100 / Nitrite: Negative   Leuk Esterase: Negative / RBC: 3-5 /HPF / WBC 0-2   Sq Epi: x / Non Sq Epi: Occasional / Bacteria: x        MEDICATIONS  (STANDING):  amLODIPine   Tablet 5 milliGRAM(s) Oral daily  atorvastatin 20 milliGRAM(s) Oral at bedtime  dextrose 5%. 1000 milliLiter(s) (100 mL/Hr) IV Continuous <Continuous>  dextrose 5%. 1000 milliLiter(s) (50 mL/Hr) IV Continuous <Continuous>  dextrose 50% Injectable 25 Gram(s) IV Push once  dextrose 50% Injectable 12.5 Gram(s) IV Push once  dextrose 50% Injectable 25 Gram(s) IV Push once  folic acid 1 milliGRAM(s) Oral daily  glucagon  Injectable 1 milliGRAM(s) IntraMuscular once  heparin   Injectable 5000 Unit(s) SubCutaneous every 8 hours  insulin lispro (ADMELOG) corrective regimen sliding scale   SubCutaneous three times a day before meals  metoprolol tartrate 25 milliGRAM(s) Oral two times a day  multivitamin 1 Tablet(s) Oral daily  piperacillin/tazobactam IVPB.. 3.375 Gram(s) IV Intermittent every 12 hours  sodium chloride 0.9%. 1000 milliLiter(s) (75 mL/Hr) IV Continuous <Continuous>  thiamine 100 milliGRAM(s) Oral daily    MEDICATIONS  (PRN):  dextrose Oral Gel 15 Gram(s) Oral once PRN Blood Glucose LESS THAN 70 milliGRAM(s)/deciliter          PHYSICAL EXAM:  GENERAL: NAD, well-groomed, well-developed  HEAD:  Atraumatic, Normocephalic  CHEST/LUNG: Clear to percussion bilaterally; No rales, rhonchi, wheezing, or rubs  HEART: Regular rate and rhythm; No murmurs, rubs, or gallops  ABDOMEN: Soft, Nontender, Nondistended; Bowel sounds present  EXTREMITIES:  2+ Peripheral Pulses, No clubbing, cyanosis, or edema  LYMPH: No lymphadenopathy noted  SKIN: No rashes or lesions    Care Discussed with Consultants/Other Providers [ x] YES  [ ] NO

## 2022-10-09 NOTE — CONSULT NOTE ADULT - ASSESSMENT
ADRIANNA on CKD 3: Prerenal azotemia, R/o retention  Pneumonia  Hypertension  Diabetes    Will follow creatinine trend. Gentle IV hydration. On Rx for pneumonia. Will get kidney and bladder sonogram.   Monitor BP trend. Titrate BP meds as needed. Monitor blood sugar levels. Insulin coverage as needed. Dietary restriction.   Discussed with patients wife over the phone. Avoid nephrotoxic meds as possible. Avoid ACEI, ARB, NSAIDs and IV contrast.   Further recommendations pending clinical course. Thank you for the courtesy of this referral.

## 2022-10-09 NOTE — PROGRESS NOTE ADULT - ASSESSMENT
62-year-old male with past medical hx of diabetes hypertension hyperlipidemia here for elevated blood sugar for the last few days.  Wife states patient also has been more confused but only at night.  Patient also noted to have shaking and tremors throughout the day.  Patient denies any cough fever chills numbness tingling or weakness no trauma or falls.  Of note patient quit drinking wine 1 week ago usually drinks about 3 to 4 glasses of homemade wine.  Patient denies any chest pain shortness of breath nausea vomiting.  Patient states had diarrhea few days ago now resolved.    1 PNA  - cw abx  - id fu   - will monitor    2 Uncontrolled dm  - ro DKA  - endo fu pending   - monitor FS  - ISS    3 HTN  - cw home meds  - dash diet    4 HLD  - cw statin    5 Alcohol abuse  - stopped drinking  - [facility] protocol  - low risk of withdrawal

## 2022-10-09 NOTE — CONSULT NOTE ADULT - CONSULT REQUESTED DATE/TIME
09-Oct-2022 07:08
09-Oct-2022 11:22
09-Oct-2022 11:15
08-Oct-2022 21:49
09-Oct-2022 13:51
09-Oct-2022 10:21

## 2022-10-09 NOTE — PATIENT PROFILE ADULT - LIVING ENVIRONMENT
Patient Instructions by Selena Bustamante APN at 08/28/17 09:04 AM     Author:  Selena Bustamante APN Service:  (none) Author Type:  Nurse Practitioner     Filed:  08/28/17 09:05 AM Encounter Date:  8/28/2017 Status:  Signed     :  Selena Bustamante APN (Nurse Practitioner)            PATIENT INFORMATION   We recommend the following:  Mammogram Instructions:    Present day mammography is not only performed with minimal amounts of radiation, but it also is extremely sensitive. Extremely small abnormalities, less than 1/8 of an inch, can be seen with this technique, and it is often impossible to determine the cause of the abnormality from the mammographic appearance.    Therefore, additional imaging,  follow-up studies in three, four, or six months' time,  or biopsy of the abnormality is commonly recommended. According to our most recent results, nine (9) out of every ten (10) biopsies that are performed on abnormalities seen by mammography, but not felt by physical examination, prove to be benign.    More important is the fact that if the lesion, which is seen on mammography but not felt on physical examination is malignant, it is usually at an extremely early stage. Most of these lesions are considered minimal cancers and have a cure rate of over 90 percent.    We hope that this ronda will help allay the anxiety that is inherent in a screening procedure such as this.  We feel this procedure is vitally important in treating diseases of the breast at the earliest possible stage.    To schedule your mammogram please call [phone]    INSTRUCTIONS:  1. The day of the exam you may bathe as usual.  DO NOT use powders, creams or deodorants on the breast or underarm area.  2. To reduce tenderness in the breasts, it is suggested that you refrain from caffeine for one week prior to your mammogram.  3. Please come to the Imaging [facility] at or before your appointment time so that we may start the exam as close to your  appointment time as possible.  If you need to cancel, please call [phone] as soon as possible, and we will be happy to reschedule it for you.  Due to our busy schedule, tardiness may result in having to reschedule your exam.  4. If your previous mammogram was not performed at [facility], you must obtain the films and bring them with you to your mammogram appointment or have them mailed to the address below.  5. Due to safety concerns, another adult must accompany children that need supervision.    LOCATION:  [facility]  [address] [place] [address]        Please follow up:  . Return for annual Gyn exam in one year or earlier with any additional concerns.    Please feel free to contact our office at [phone] with any additional concerns.    Thank you for allowing us to serve you today!     Additional Educational Resources:  For additional resources regarding your symptoms, diagnosis, or further health information, please visit the Health Resources section on [email or web address] or the Online Health Resources section in [facility].          Revision History        User Key Date/Time User Provider Type Action    > [N/A] 08/28/17 09:05 AM Selena Bustamante APN Nurse Practitioner Sign             no

## 2022-10-09 NOTE — CONSULT NOTE ADULT - ASSESSMENT
62-year-old male with past medical hx of diabetes hypertension hyperlipidemia here for elevated blood sugar for the last few days.  Wife states patient also has been more confused but only at night.  Patient also noted to have shaking and tremors throughout the day.  Patient has been coughing now for several days and the wife reports she just thought he had a cold.  had diarrhea few days ago now resolved.     RECOMMENDATIONS  Cough, abn lung exam and imaging, concerning for PNA and prodrome with diarrhea suggesting this may be post viral, legionella, or typical bacterial process. Hyperglycemia may be due to acute infection.   -Zosyn started 10/8-early am  -will send RVP, urine legionella, sputum, nares MRSA    recs to follow    Thank you for consulting us and involving us in the management of this most interesting and challenging case.  We will follow along in the care of this patient. Please call us at [phone] or text me directly on my cell# at [phone] with any concerns.

## 2022-10-09 NOTE — PATIENT PROFILE ADULT - FALL HARM RISK - UNIVERSAL INTERVENTIONS
Bed in lowest position, wheels locked, appropriate side rails in place/Call bell, personal items and telephone in reach/Instruct patient to call for assistance before getting out of bed or chair/Non-slip footwear when patient is out of bed/[place] to call system/Physically safe environment - no spills, clutter or unnecessary equipment/Purposeful Proactive Rounding/Room/bathroom lighting operational, light cord in reach

## 2022-10-09 NOTE — CONSULT NOTE ADULT - SUBJECTIVE AND OBJECTIVE BOX
[facility] of INFECTIOUS DISEASE  Daron Bañuelos MD PhD, Carmen Tamayo MD, Nereyda Greenwood MD, Chase Manrique MD, Nikolas Wood MD  and providing coverage with Jyoti Nelson MD  Providing Infectious Disease Consultations at [facility], [facility], [facility]'s    Office# [phone] to schedule follow up appointments  Answering Service for urgent calls or New Consults [phone]  Cell# to text for urgent issues Daron Bañuelos [phone]     HPI:  62-year-old male with past medical hx of diabetes hypertension hyperlipidemia here for elevated blood sugar for the last few days.  Wife states patient also has been more confused but only at night.  Patient also noted to have shaking and tremors throughout the day.  Patient has been coughing now for several days and the wife reports she just thought he had a cold.  had diarrhea few days ago now resolved.       PAST MEDICAL & SURGICAL HISTORY:  Alcohol use  DM  HTN  HLD    Antimicrobials  piperacillin/tazobactam IVPB.. 3.375 Gram(s) IV Intermittent every 12 hours      Immunological      Other  amLODIPine   Tablet 5 milliGRAM(s) Oral daily  atorvastatin 20 milliGRAM(s) Oral at bedtime  dextrose 5%. 1000 milliLiter(s) IV Continuous <Continuous>  dextrose 5%. 1000 milliLiter(s) IV Continuous <Continuous>  dextrose 50% Injectable 25 Gram(s) IV Push once  dextrose 50% Injectable 12.5 Gram(s) IV Push once  dextrose 50% Injectable 25 Gram(s) IV Push once  dextrose Oral Gel 15 Gram(s) Oral once PRN  folic acid 1 milliGRAM(s) Oral daily  glucagon  Injectable 1 milliGRAM(s) IntraMuscular once  heparin   Injectable 5000 Unit(s) SubCutaneous every 8 hours  insulin lispro (ADMELOG) corrective regimen sliding scale   SubCutaneous three times a day before meals  metoprolol tartrate 25 milliGRAM(s) Oral two times a day  multivitamin 1 Tablet(s) Oral daily  sodium chloride 0.9%. 1000 milliLiter(s) IV Continuous <Continuous>  thiamine 100 milliGRAM(s) Oral daily      Allergies    No Known Allergies    Intolerances        SOCIAL HISTORY:  Social History:  neg (08 Oct 2022 19:59)      FAMILY HISTORY: noncontrib      ROS:    EYES:  Negative  blurry vision or double vision  GASTROINTESTINAL:  Negative for nausea, vomiting, no chest pain  -otherwise negative except for subjective    Vital Signs Last 24 Hrs  T(C): 36.7 (09 Oct 2022 07:15), Max: 37.2 (08 Oct 2022 12:10)  T(F): 98 (09 Oct 2022 07:15), Max: 99 (08 Oct 2022 12:10)  HR: 76 (09 Oct 2022 07:15) (76 - 99)  BP: 124/72 (09 Oct 2022 07:15) (106/67 - 135/75)  BP(mean): --  RR: 18 (09 Oct 2022 07:15) (16 - 18)  SpO2: 96% (09 Oct 2022 07:15) (93% - 96%)    Parameters below as of 09 Oct 2022 07:15  Patient On (Oxygen Delivery Method): room air        PE:  WDWN in no distress  HEENT:  NC, PERRL, sclerae anicteric, conjunctivae clear, EOMI.  Sinuses nontender, no nasal exudate.  No buccal or pharyngeal lesions, erythema or exudate  Neck:  Supple, no adenopathy  Lungs:  No accessory muscle use, decreased BS NING and coarse BS left mid lung  Cor:  distant  Abd:  Symmetric, normoactive BS.  Soft, nontender, no masses, guarding or rebound.  Liver and spleen not enlarged  Extrem:  No cyanosis or edema  Skin:  No rashes.  Neuro: grossly intact  Musc: moving all limbs freely, no focal deficits        LABS:                        12.1   6.60  )-----------( 282      ( 09 Oct 2022 04:49 )             36.2       WBC Count: 6.60 K/uL (10-09-22 @ 04:49)  WBC Count: 6.93 K/uL (10-08-22 @ 12:25)      10-09    137  |  104  |  62<H>  ----------------------------<  181<H>  4.4   |  23  |  3.40<H>    Ca    9.0      09 Oct 2022 04:49  Phos  3.0     10-09  Mg     2.3     10-09    TPro  6.6  /  Alb  2.3<L>  /  TBili  0.7  /  DBili  x   /  AST  72<H>  /  ALT  89<H>  /  AlkPhos  71  10-09      Creatinine, Serum: 3.40 mg/dL (10-09-22 @ 04:49)  Creatinine, Serum: 3.40 mg/dL (10-09-22 @ 00:36)  Creatinine, Serum: 3.40 mg/dL (10-08-22 @ 22:19)  Creatinine, Serum: 3.30 mg/dL (10-08-22 @ 22:19)  Creatinine, Serum: 3.90 mg/dL (10-08-22 @ 12:25)      Urinalysis Basic - ( 08 Oct 2022 15:00 )    Color: Yellow / Appearance: Clear / S.010 / pH: x  Gluc: x / Ketone: Negative  / Bili: Negative / Urobili: Negative   Blood: x / Protein: 100 / Nitrite: Negative   Leuk Esterase: Negative / RBC: 3-5 /HPF / WBC 0-2   Sq Epi: x / Non Sq Epi: Occasional / Bacteria: x              MICROBIOLOGY:      RADIOLOGY & ADDITIONAL STUDIES:    --< from: CT Chest No Cont (10.08.22 @ 23:47) >    ACC: [id number] EXAM:  CT CHEST                          PROCEDURE DATE:  10/08/2022          INTERPRETATION:  CLINICAL INFORMATION: Suspected pneumonia on CT   abdomen/pelvis.    COMPARISON: CT abdomen/pelvis from 10/08/2022 at 5:43 PM.  CT chest,   abdomen and pelvis from 2018.    CONTRAST/COMPLICATIONS:  IV Contrast: NONE  Oral Contrast: NONE  Complications: None reported at time of study completion    PROCEDURE:  CT of the Chest was performed.  Sagittal and coronal reformats were performed.    FINDINGS:    LUNGS AND AIRWAYS: Patent central airways.  There is patchy groundglass   opacity and more dense airspace opacity in the left upper lobe and   lingula, compatible with pneumonia.  There are numerous scattered 2 mm   nodules in theright upper and right lower lobes, and a few scattered 2   mm nodules in the left lower lobe.  PLEURA: No pleural effusion.  MEDIASTINUM AND LESLIE: Mediastinal lymph nodes measure up to 5 mm short   access in the superior mediastinum on the right side(2:128), 7 mm short   axis in the superior mediastinum on the left side (3:120), 1 cm short   axis in the AP window (3:188) 9 mm short access in the right lower   paratracheal region (3:78), 9 mm short axis in the left lower   paratracheal region (3:195) 9 mm short axis in subcarina region (3:224).    Hilar lymph nodes measure up to 6 mm short access in the left (3:254)  VESSELS: Borderline aneurysmal dilatation of proximal aorta measuring   approximately 4 cm at the sinuses of Valsalva (601:40) and 3.9 cm in the   mid ascending thoracic aorta at the level of the main pulmonary trunk   (601:36).  Scattered atherosclerotic calcifications of the thoracic aorta   and arch vessels.  HEART: Left ventricle appears borderline enlarged.  Moderate to severe   atherosclerotic calcification of the coronary arteries. No pericardial   effusion.  CHEST WALL AND LOWER NECK: Within normal limits.  VISUALIZED UPPER ABDOMEN: Status post cholecystectomy.  Nonspecific   adrenal gland thickening.  BONES: Within normal limits.    IMPRESSION:  Patchy groundglass opacity more dense airspace opacity in the left upper   lobe and lingula, compatible with pneumonia.  Follow-up chest CT is   recommended in 1-3 months to ensure resolution the imaging findings and   to exclude an underlying lung lesion.    Numerous scattered 2 mm nodules in the lungs are of doubtful clinical   significance.  Nonspecific mediastinal and hilar lymph nodes measure up   to 1 cm short axis.  These can be reassessed on follow-up chest CT scan.    Borderline interstitial dilatation of the proximal aorta measuring 4 cm   at the sinuses of Valsalva and 3.9 cm in the mid ascending thoracic aorta   at the level of the main pulmonary trunk.

## 2022-10-09 NOTE — CONSULT NOTE ADULT - SUBJECTIVE AND OBJECTIVE BOX
CHIEF COMPLAINT: Patient is a 62y old  Male who presents with a chief complaint of AMS (09 Oct 2022 07:07)      HPI:  62-year-old male with past medical hx of diabetes hypertension hyperlipidemia here for elevated blood sugar for the last few days.  Wife states patient also has been more confused but only at night.  Patient also noted to have shaking and tremors throughout the day.  Patient denies any cough fever chills numbness tingling or weakness no trauma or falls.  Of note patient quit drinking wine 1 week ago usually drinks about 3 to 4 glasses of homemade wine.  Patient denies any chest pain shortness of breath nausea vomiting.  Patient states had diarrhea few days ago now resolved. (08 Oct 2022 19:59)      PAST MEDICAL & SURGICAL HISTORY:  Alcohol use          SOCIAL HISTORY:    FAMILY HISTORY: FAMILY HISTORY:      MEDICATIONS  (STANDING):  amLODIPine   Tablet 5 milliGRAM(s) Oral daily  atorvastatin 20 milliGRAM(s) Oral at bedtime  dextrose 5%. 1000 milliLiter(s) (100 mL/Hr) IV Continuous <Continuous>  dextrose 5%. 1000 milliLiter(s) (50 mL/Hr) IV Continuous <Continuous>  dextrose 50% Injectable 25 Gram(s) IV Push once  dextrose 50% Injectable 12.5 Gram(s) IV Push once  dextrose 50% Injectable 25 Gram(s) IV Push once  folic acid 1 milliGRAM(s) Oral daily  glucagon  Injectable 1 milliGRAM(s) IntraMuscular once  heparin   Injectable 5000 Unit(s) SubCutaneous every 8 hours  insulin lispro (ADMELOG) corrective regimen sliding scale   SubCutaneous three times a day before meals  metoprolol tartrate 25 milliGRAM(s) Oral two times a day  multivitamin 1 Tablet(s) Oral daily  piperacillin/tazobactam IVPB.. 3.375 Gram(s) IV Intermittent every 12 hours  sodium chloride 0.9%. 1000 milliLiter(s) (75 mL/Hr) IV Continuous <Continuous>  thiamine 100 milliGRAM(s) Oral daily    MEDICATIONS  (PRN):  dextrose Oral Gel 15 Gram(s) Oral once PRN Blood Glucose LESS THAN 70 milliGRAM(s)/deciliter      Allergies    No Known Allergies    Intolerances        REVIEW OF SYSTEMS:    CONSTITUTIONAL: No weakness, fevers or chills  EYES: No visual changes, No diplopia  ENMT: No throat pain , No exudate  NECK: No pain or stiffness  RESPIRATORY: No cough, wheezing, hemoptysis; No shortness of breath  CARDIOVASCULAR: No chest pain or palpitations  GASTROINTESTINAL: No abdominal pain. No nausea, vomiting, or hematemesis; No diarrhea or constipation. No melena or hematochezia.  GENITOURINARY: No dysuria, frequency or hematuria  NEUROLOGICAL: No numbness or weakness  SKIN: No itching or rash  All other review of systems is negative unless indicated above    VITAL SIGNS:   Vital Signs Last 24 Hrs  T(C): 36.7 (09 Oct 2022 07:15), Max: 37.2 (08 Oct 2022 12:10)  T(F): 98 (09 Oct 2022 07:15), Max: 99 (08 Oct 2022 12:10)  HR: 76 (09 Oct 2022 07:15) (76 - 99)  BP: 124/72 (09 Oct 2022 07:15) (106/67 - 135/75)  BP(mean): --  RR: 18 (09 Oct 2022 07:15) (16 - 18)  SpO2: 96% (09 Oct 2022 07:15) (93% - 96%)    Parameters below as of 09 Oct 2022 07:15  Patient On (Oxygen Delivery Method): room air        I&O's Summary      PHYSICAL EXAM:    Constitutional: NAD, awake and alert, well-developed  Eyes:  EOMI,  Pupils round, no lesions  ENMT: no exudate or erythema  Pulmonary: Non-labored, breath sounds are clear bilaterally, No wheezing, rales or rhonchi  Cardiovascular: PMI not palpable non-displaced Regular S1 and S2, no murmurs, rubs, gallops or clicks  Gastrointestinal: Bowel Sounds present, soft, nontender.   Lymph: No peripheral edema. No cervical lymphadenopathy.  Neurological: Alert, no focal deficits  Skin: No rashes. Changes of chronic venous stasis. No cyanosis.  Psych:  Mood & affect appropriate Confused.    LABS: All Labs Reviewed:                        12.1   6.60  )-----------( 282      ( 09 Oct 2022 04:49 )             36.2                         13.5   6.93  )-----------( 263      ( 08 Oct 2022 12:25 )             39.9     09 Oct 2022 04:49    137    |  104    |  62     ----------------------------<  181    4.4     |  23     |  3.40   09 Oct 2022 00:36    137    |  104    |  65     ----------------------------<  193    4.4     |  23     |  3.40   08 Oct 2022 22:19    134    |  103    |  65     ----------------------------<  229    4.4     |  21     |  3.40     Ca    9.0        09 Oct 2022 04:49  Ca    9.2        09 Oct 2022 00:36  Ca    8.9        08 Oct 2022 22:19  Phos  3.0       09 Oct 2022 00:36  Phos  3.1       08 Oct 2022 22:19  Mg     2.3       09 Oct 2022 00:36  Mg     2.2       08 Oct 2022 22:19    TPro  6.6    /  Alb  2.3    /  TBili  0.7    /  DBili  x      /  AST  72     /  ALT  89     /  AlkPhos  71     09 Oct 2022 04:49  TPro  6.5    /  Alb  2.3    /  TBili  0.6    /  DBili  0.2    /  AST  65     /  ALT  81     /  AlkPhos  68     08 Oct 2022 22:19  TPro  7.8    /  Alb  2.7    /  TBili  0.7    /  DBili  x      /  AST  56     /  ALT  84     /  AlkPhos  77     08 Oct 2022 12:25          Blood Culture:         RADIOLOGY/EKG:

## 2022-10-09 NOTE — CONSULT NOTE ADULT - ASSESSMENT
62-year-old male with past medical hx of diabetes hypertension hyperlipidemia    ams  hld  htn  pna  acute infection  DM 62-year-old male with past medical hx of diabetes hypertension hyperlipidemia    ams  hld  htn  pna  acute infection  DM  GGO on CT - Pulm nodules on CT  Smoker  wine drinker - ROSE - Etoh use disorder    ct reviewed with pt and wife  tx for poss PNA - Lower resp tract infection  ID eval  cx - biomarkers  pt will need follow up CT imaging in 6 - 8 weeks - eval resolution of GGO and Pulm Nodules and Meds LN - in a smoker  pt will need PFT as outpatient - and Pulm follow up  smoking cess ed  bronchodilators  cough rx regimen  ROSE - education - counseling - emotional support  replete lytes  dvt p  monitor VS and Sat

## 2022-10-09 NOTE — CONSULT NOTE ADULT - ASSESSMENT
62-year-old male with past medical hx of diabetes hypertension hyperlipidemia chronic renal insufficiency presents with shaking, sob and PNA    ams  hld  htn  pna  acute infection  DM    admit  IV antibiotics

## 2022-10-09 NOTE — CONSULT NOTE ADULT - SUBJECTIVE AND OBJECTIVE BOX
Patient is a 62y old  Male who presents with a chief complaint of AMS (09 Oct 2022 12:33)      Reason For Consult: dm2 uncontrolled    HPI:  62-year-old male with past medical hx of diabetes hypertension hyperlipidemia here for elevated blood sugar for the last few days.  Wife states patient also has been more confused but only at night.  Patient also noted to have shaking and tremors throughout the day.  Patient denies any cough fever chills numbness tingling or weakness no trauma or falls.  Of note patient quit drinking wine 1 week ago usually drinks about 3 to 4 glasses of homemade wine.  Patient denies any chest pain shortness of breath nausea vomiting.  Patient states had diarrhea few days ago now resolved. (08 Oct 2022 19:59)      PAST MEDICAL & SURGICAL HISTORY:  Alcohol use      No significant past surgical history          FAMILY HISTORY:  No pertinent family history in first degree relatives          Social History:    MEDICATIONS  (STANDING):  amLODIPine   Tablet 5 milliGRAM(s) Oral daily  atorvastatin 20 milliGRAM(s) Oral at bedtime  dextrose 5%. 1000 milliLiter(s) (100 mL/Hr) IV Continuous <Continuous>  dextrose 5%. 1000 milliLiter(s) (50 mL/Hr) IV Continuous <Continuous>  dextrose 50% Injectable 25 Gram(s) IV Push once  dextrose 50% Injectable 12.5 Gram(s) IV Push once  dextrose 50% Injectable 25 Gram(s) IV Push once  folic acid 1 milliGRAM(s) Oral daily  glucagon  Injectable 1 milliGRAM(s) IntraMuscular once  heparin   Injectable 5000 Unit(s) SubCutaneous every 8 hours  insulin lispro (ADMELOG) corrective regimen sliding scale   SubCutaneous three times a day before meals  metoprolol tartrate 25 milliGRAM(s) Oral two times a day  multivitamin 1 Tablet(s) Oral daily  piperacillin/tazobactam IVPB.. 3.375 Gram(s) IV Intermittent every 12 hours  sodium chloride 0.45%. 1000 milliLiter(s) (60 mL/Hr) IV Continuous <Continuous>  sodium chloride 0.9%. 1000 milliLiter(s) (75 mL/Hr) IV Continuous <Continuous>  thiamine 100 milliGRAM(s) Oral daily    MEDICATIONS  (PRN):  ALBUTerol    90 MICROgram(s) HFA Inhaler 2 Puff(s) Inhalation every 6 hours PRN Shortness of Breath and/or Wheezing  dextrose Oral Gel 15 Gram(s) Oral once PRN Blood Glucose LESS THAN 70 milliGRAM(s)/deciliter  guaiFENesin Oral Liquid (Sugar-Free) 200 milliGRAM(s) Oral every 6 hours PRN Cough        T(C): 36.4 (10-09-22 @ 12:04), Max: 36.8 (10-08-22 @ 15:54)  HR: 76 (10-09-22 @ 12:04) (76 - 88)  BP: 122/69 (10-09-22 @ 12:04) (122/69 - 135/75)  RR: 16 (10-09-22 @ 12:04) (16 - 18)  SpO2: 94% (10-09-22 @ 12:04) (93% - 96%)  Wt(kg): --    PHYSICAL EXAM:  GENERAL: NAD, well-groomed, well-developed  HEAD:  Atraumatic, Normocephalic  NECK: Supple, No JVD, Normal thyroid  CHEST/LUNG: Clear to percussion bilaterally; No rales, rhonchi, wheezing, or rubs  HEART: Regular rate and rhythm; No murmurs, rubs, or gallops  ABDOMEN: Soft, Nontender, Nondistended; Bowel sounds present  EXTREMITIES:  2+ Peripheral Pulses, No clubbing, cyanosis, or edema  SKIN: No rashes or lesions    CAPILLARY BLOOD GLUCOSE      POCT Blood Glucose.: 212 mg/dL (09 Oct 2022 11:01)  POCT Blood Glucose.: 180 mg/dL (09 Oct 2022 06:08)  POCT Blood Glucose.: 223 mg/dL (08 Oct 2022 22:20)                            12.1   6.60  )-----------( 282      ( 09 Oct 2022 04:49 )             36.2       CMP:  10-09 @ 04:49  SGPT 89  Albumin 2.3   Alk Phos 71   Anion Gap 10   SGOT 72   Total Bili 0.7   BUN 62   Calcium Total 9.0   CO2 23   Chloride 104   Creatinine 3.40   eGFR if AA --   eGFR if non AA --   Glucose 181   Potassium 4.4   Protein 6.6   Sodium 137      Thyroid Function Tests:      Diabetes Tests:       Radiology:

## 2022-10-09 NOTE — CONSULT NOTE ADULT - SUBJECTIVE AND OBJECTIVE BOX
Patient is a 62y old  Male who presents with a chief complaint of AMS (09 Oct 2022 12:33)    HPI:  62-year-old male with past medical hx of diabetes hypertension hyperlipidemia here for elevated blood sugar for the last few days.  Wife states patient also has been more confused but only at night.  Patient also noted to have shaking and tremors throughout the day.  Patient denies any cough fever chills numbness tingling or weakness no trauma or falls.  Of note patient quit drinking wine 1 week ago usually drinks about 3 to 4 glasses of homemade wine.  Patient denies any chest pain shortness of breath nausea vomiting.  Patient states had diarrhea few days ago now resolved. (08 Oct 2022 19:59)    Renal consult called for ADRIANNA.       PAST MEDICAL HISTORY:  Alcohol use      PAST SURGICAL HISTORY:  No significant past surgical history      FAMILY HISTORY:  No pertinent family history in first degree relatives    SOCIAL HISTORY: No smoking or alcohol use     Allergies    No Known Allergies    Intolerances      Home Medications:  amLODIPine 5 mg oral tablet: 1 tab(s) orally once a day (08 Oct 2022 20:11)  atorvastatin 20 mg oral tablet: 1 tab(s) orally once a day (08 Oct 2022 20:11)  cholecalciferol 50 mcg (2000 intl units) oral capsule: 1 cap(s) orally once a day (08 Oct 2022 20:11)  Farxiga 10 mg oral tablet: 1 tab(s) orally once a day (08 Oct 2022 20:11)  Januvia 100 mg oral tablet: 1 tab(s) orally once a day (08 Oct 2022 20:11)  losartan 100 mg oral tablet: 1 tab(s) orally once a day (08 Oct 2022 20:11)  metFORMIN 500 mg oral tablet: 1 tab(s) orally 2 times a day (08 Oct 2022 20:11)  metoprolol tartrate 25 mg oral tablet: 1 tab(s) orally 2 times a day (08 Oct 2022 20:11)    MEDICATIONS  (STANDING):  amLODIPine   Tablet 5 milliGRAM(s) Oral daily  atorvastatin 20 milliGRAM(s) Oral at bedtime  dextrose 5%. 1000 milliLiter(s) (100 mL/Hr) IV Continuous <Continuous>  dextrose 5%. 1000 milliLiter(s) (50 mL/Hr) IV Continuous <Continuous>  dextrose 50% Injectable 25 Gram(s) IV Push once  dextrose 50% Injectable 12.5 Gram(s) IV Push once  dextrose 50% Injectable 25 Gram(s) IV Push once  folic acid 1 milliGRAM(s) Oral daily  glucagon  Injectable 1 milliGRAM(s) IntraMuscular once  heparin   Injectable 5000 Unit(s) SubCutaneous every 8 hours  insulin lispro (ADMELOG) corrective regimen sliding scale   SubCutaneous three times a day before meals  metoprolol tartrate 25 milliGRAM(s) Oral two times a day  multivitamin 1 Tablet(s) Oral daily  piperacillin/tazobactam IVPB.. 3.375 Gram(s) IV Intermittent every 12 hours  sodium chloride 0.9%. 1000 milliLiter(s) (75 mL/Hr) IV Continuous <Continuous>  thiamine 100 milliGRAM(s) Oral daily    MEDICATIONS  (PRN):  dextrose Oral Gel 15 Gram(s) Oral once PRN Blood Glucose LESS THAN 70 milliGRAM(s)/deciliter      REVIEW OF SYSTEMS:  General: no distress  Respiratory: No cough, SOB  Cardiovascular: No CP or Palpitations	  Gastrointestinal: No nausea, Vomiting. No diarrhea  Genitourinary: No urinary complaints	  Musculoskeletal: No new rash or lesions		  all other systems negative    T(F): 97.5 (10-09-22 @ 12:04), Max: 98.3 (10-08-22 @ 15:54)  HR: 76 (10-09-22 @ 12:04) (76 - 88)  BP: 122/69 (10-09-22 @ 12:04) (122/69 - 135/75)  RR: 16 (10-09-22 @ 12:04) (16 - 18)  SpO2: 94% (10-09-22 @ 12:04) (93% - 96%)  Wt(kg): --    PHYSICAL EXAM:  General: NAD  Respiratory: b/l air entry  Cardiovascular: S1 S2  Gastrointestinal: soft  Extremities: no edema        10-09    137  |  104  |  62<H>  ----------------------------<  181<H>  4.4   |  23  |  3.40<H>    Ca    9.0      09 Oct 2022 04:49  Phos  3.0     10-09  Mg     2.3     10-09    T Pro  6.6  /  Alb  2.3<L>  /  T Bili  0.7  /  D Bili  x   /  AST  72<H>  /  ALT  89<H>  /  Alk Phos  71  10-09                          12.1   6.60  )-----------( 282      ( 09 Oct 2022 04:49 )             36.2       Hemoglobin: 12.1 g/dL (10-09 @ 04:49)  Hematocrit: 36.2 % (10-09 @ 04:49)  Potassium, Serum: 4.4 mmol/L (10-09 @ 04:49)  Blood Urea Nitrogen, Serum: 62 mg/dL (10-09 @ 04:49)      Creatinine, Serum: 3.40 (10-09 @ 04:49)  Creatinine, Serum: 3.40 (10-09 @ 00:36)  Creatinine, Serum: 3.40 (10-08 @ 22:19)  Creatinine, Serum: 3.90 (10-08 @ 12:25)      Urinalysis Basic - ( 08 Oct 2022 15:00 )    Color: Yellow / Appearance: Clear / S.010 / pH: x  Gluc: x / Ketone: Negative  / Bili: Negative / Urobili: Negative   Blood: x / Protein: 100 / Nitrite: Negative   Leuk Esterase: Negative / RBC: 3-5 /HPF / WBC 0-2   Sq Epi: x / Non Sq Epi: Occasional / Bacteria: x      LIVER FUNCTIONS - ( 09 Oct 2022 04:49 )  Alb: 2.3 g/dL / Pro: 6.6 g/dL / ALK PHOS: 71 U/L / ALT: 89 U/L / AST: 72 U/L / GGT: x               < from: CT Chest No Cont (10.08.22 @ 23:47) >    ACC: [id number] EXAM:  CT CHEST                          PROCEDURE DATE:  10/08/2022          INTERPRETATION:  CLINICAL INFORMATION: Suspected pneumonia on CT   abdomen/pelvis.    COMPARISON: CT abdomen/pelvis from 10/08/2022 at 5:43 PM.  CT chest,   abdomen and pelvis from 2018.    CONTRAST/COMPLICATIONS:  IV Contrast: NONE  Oral Contrast: NONE  Complications: None reported at time of study completion    PROCEDURE:  CT of the Chest was performed.  Sagittal and coronal reformats were performed.    FINDINGS:    LUNGS AND AIRWAYS: Patent central airways.  There is patchy groundglass   opacity and more dense airspace opacity in the left upper lobe and   lingula, compatible with pneumonia.  There are numerous scattered 2 mm   nodules in theright upper and right lower lobes, and a few scattered 2   mm nodules in the left lower lobe.  PLEURA: No pleural effusion.  MEDIASTINUM AND LESLIE: Mediastinal lymph nodes measure up to 5 mm short   access in the superior mediastinum on the right side(2:128), 7 mm short   axis in the superior mediastinum on the left side (3:120), 1 cm short   axis in the AP window (3:188) 9 mm short access in the right lower   paratracheal region (3:78), 9 mm short axis in the left lower   paratracheal region (3:195) 9 mm short axis in subcarina region (3:224).    Hilar lymph nodes measure up to 6 mm short access in the left (3:254)  VESSELS: Borderline aneurysmal dilatation of proximal aorta measuring   approximately 4 cm at the sinuses of Valsalva (601:40) and 3.9 cm in the   mid ascending thoracic aorta at the level of the main pulmonary trunk   (601:36).  Scattered atherosclerotic calcifications of the thoracic aorta   and arch vessels.  HEART: Left ventricle appears borderline enlarged.  Moderate to severe   atherosclerotic calcification of the coronary arteries. No pericardial   effusion.  CHEST WALL AND LOWER NECK: Within normal limits.  VISUALIZED UPPER ABDOMEN: Status post cholecystectomy.  Nonspecific   adrenal gland thickening.  BONES: Within normal limits.    IMPRESSION:  Patchy groundglass opacity more dense airspace opacity in the left upper   lobe and lingula, compatible with pneumonia.  Follow-up chest CT is   recommended in 1-3 months to ensure resolution the imaging findings and   to exclude an underlying lung lesion.    Numerous scattered 2 mm nodules in the lungs are of doubtful clinical   significance.  Nonspecific mediastinal and hilar lymph nodes measure up   to 1 cm short axis.  These can be reassessed on follow-up chest CT scan.    Borderline interstitial dilatation of the proximal aorta measuring 4 cm   at the sinuses of Valsalva and 3.9 cm in the mid ascending thoracic aorta   at the level of the main pulmonary trunk.        --- End of Report ---            RIZWAN UQIROGA; Attending Radiologist  This document has been electronically signed. Oct  9 2022 12:58AM    < end of copied text >  < from: Xray Chest 1 View AP/PA (10.08.22 @ 14:22) >    ACC: [id number] EXAM:  XR CHEST AP OR PA 1V                          PROCEDURE DATE:  10/08/2022          INTERPRETATION:  Chest portable semiupright    CLINICAL HISTORY: Diabetic, rule out pneumonia    Comparison:  CT chest of the same day    Normalheart and mediastinum.    The lungs reveal a peripheral area of infiltrate extending from the left   upper lobe towards the lingula consistent with pneumonia as described on   CT of the same day.    The angles are sharp.    IMPRESSION: Peripheral left upper and lingular pneumonia.    --- End of Report ---            SCARLETT PRASAD MD; Attending Radiologist  This document has been electronically signed. Oct  9 2022 11:13AM    < end of copied text >

## 2022-10-09 NOTE — CONSULT NOTE ADULT - SUBJECTIVE AND OBJECTIVE BOX
Date/Time Patient Seen:  		  Referring MD:   Data Reviewed	       Patient is a 62y old  Male who presents with a chief complaint of AMS (08 Oct 2022 21:49)      Subjective/HPI   62-year-old male with past medical hx of diabetes hypertension hyperlipidemia here for elevated blood sugar for the last few days.  Wife states patient also has been more confused but only at night.  Patient also noted to have shaking and tremors throughout the day.  Patient denies any cough fever chills numbness tingling or weakness no trauma or falls.  Of note patient quit drinking wine 1 week ago usually drinks about 3 to 4 glasses of homemade wine.  Patient denies any chest pain shortness of breath nausea vomiting.  Patient states had diarrhea few days ago now resolved.  PAST MEDICAL & SURGICAL HISTORY:        Medication list         MEDICATIONS  (STANDING):  amLODIPine   Tablet 5 milliGRAM(s) Oral daily  atorvastatin 20 milliGRAM(s) Oral at bedtime  dextrose 5%. 1000 milliLiter(s) (100 mL/Hr) IV Continuous <Continuous>  dextrose 5%. 1000 milliLiter(s) (50 mL/Hr) IV Continuous <Continuous>  dextrose 50% Injectable 25 Gram(s) IV Push once  dextrose 50% Injectable 12.5 Gram(s) IV Push once  dextrose 50% Injectable 25 Gram(s) IV Push once  folic acid 1 milliGRAM(s) Oral daily  glucagon  Injectable 1 milliGRAM(s) IntraMuscular once  heparin   Injectable 5000 Unit(s) SubCutaneous every 8 hours  insulin lispro (ADMELOG) corrective regimen sliding scale   SubCutaneous three times a day before meals  metoprolol tartrate 25 milliGRAM(s) Oral two times a day  multivitamin 1 Tablet(s) Oral daily  piperacillin/tazobactam IVPB.. 3.375 Gram(s) IV Intermittent every 12 hours  sodium chloride 0.9%. 1000 milliLiter(s) (75 mL/Hr) IV Continuous <Continuous>  thiamine 100 milliGRAM(s) Oral daily    MEDICATIONS  (PRN):  dextrose Oral Gel 15 Gram(s) Oral once PRN Blood Glucose LESS THAN 70 milliGRAM(s)/deciliter         Vitals log        ICU Vital Signs Last 24 Hrs  T(C): 36.7 (08 Oct 2022 23:04), Max: 37.2 (08 Oct 2022 12:10)  T(F): 98 (08 Oct 2022 23:04), Max: 99 (08 Oct 2022 12:10)  HR: 77 (08 Oct 2022 23:04) (77 - 99)  BP: 129/75 (08 Oct 2022 23:04) (106/67 - 135/75)  BP(mean): --  ABP: --  ABP(mean): --  RR: 16 (08 Oct 2022 23:04) (16 - 18)  SpO2: 95% (08 Oct 2022 23:04) (93% - 95%)    O2 Parameters below as of 08 Oct 2022 23:04  Patient On (Oxygen Delivery Method): room air                 Input and Output:  I&O's Detail      Lab Data                        12.1   6.60  )-----------( 282      ( 09 Oct 2022 04:49 )             36.2     10-09    137  |  104  |  62<H>  ----------------------------<  181<H>  4.4   |  23  |  3.40<H>    Ca    9.0      09 Oct 2022 04:49  Phos  3.0     10-09  Mg     2.3     10-09    TPro  6.6  /  Alb  2.3<L>  /  TBili  0.7  /  DBili  x   /  AST  72<H>  /  ALT  89<H>  /  AlkPhos  71  10-09            Review of Systems	      Objective     Physical Examination        Pertinent Lab findings & Imaging      Lowry:  NO   Adequate UO     I&O's Detail           Discussed with:     Cultures:	        Radiology    ACC: [id number] EXAM:  CT CHEST                          PROCEDURE DATE:  10/08/2022          INTERPRETATION:  CLINICAL INFORMATION: Suspected pneumonia on CT   abdomen/pelvis.    COMPARISON: CT abdomen/pelvis from 10/08/2022 at 5:43 PM.  CT chest,   abdomen and pelvis from 02/05/2018.    CONTRAST/COMPLICATIONS:  IV Contrast: NONE  Oral Contrast: NONE  Complications: None reported at time of study completion    PROCEDURE:  CT of the Chest was performed.  Sagittal and coronal reformats were performed.    FINDINGS:    LUNGS AND AIRWAYS: Patent central airways.  There is patchy groundglass   opacity and more dense airspace opacity in the left upper lobe and   lingula, compatible with pneumonia.  There are numerous scattered 2 mm   nodules in the right upper and right lower lobes, and a few scattered 2   mm nodules in the left lower lobe.  PLEURA: No pleural effusion.  MEDIASTINUM AND LESLIE: Mediastinal lymph nodes measure up to 5 mm short   access in the superior mediastinum on the right side (2:128), 7 mm short   axis in the superior mediastinum on the left side (3:120), 1 cm short   axis in the AP window (3:188) 9 mm short access in the right lower   paratracheal region (3:78), 9 mm short axis in the left lower   paratracheal region (3:195) 9 mm short axis in subcarina region (3:224).    Hilar lymph nodes measure up to 6 mm short access in the left (3:254)  VESSELS: Borderline aneurysmal dilatation of proximal aorta measuring   approximately 4 cm at the sinuses of Valsalva (601:40) and 3.9 cm in the   mid ascending thoracic aorta at the level of the main pulmonary trunk   (601:36).  Scattered atherosclerotic calcifications of the thoracic aorta   and arch vessels.  HEART: Left ventricle appears borderline enlarged.  Moderate to severe   atherosclerotic calcification of the coronary arteries. No pericardial   effusion.  CHEST WALL AND LOWER NECK: Within normal limits.  VISUALIZED UPPER ABDOMEN: Status post cholecystectomy.  Nonspecific   adrenal gland thickening.  BONES: Within normal limits.    IMPRESSION:  Patchy groundglass opacity more dense airspace opacity in the left upper   lobe and lingula, compatible with pneumonia.  Follow-up chest CT is   recommended in 1-3 months to ensure resolution the imaging findings and   to exclude an underlying lung lesion.    Numerous scattered 2 mm nodules in the lungs are of doubtful clinical   significance.  Nonspecific mediastinal and hilar lymph nodes measure up   to 1 cm short axis.  These can be reassessed on follow-up chest CT scan.    Borderline interstitial dilatation of the proximal aorta measuring 4 cm   at the sinuses of Valsalva and 3.9 cm in the mid ascending thoracic aorta   at the level of the main pulmonary trunk.        --- End of Report ---            ROMAINE DALEY MD; Attending Radiologist  This document has been electronically signed. Oct  9 2022 12:58AM                           Date/Time Patient Seen:  		  Referring MD:   Data Reviewed	       Patient is a 62y old  Male who presents with a chief complaint of AMS (08 Oct 2022 21:49)      Subjective/HPI  seen and examined  spoke with wife  smoker  born in [place]       62-year-old male with past medical hx of diabetes hypertension hyperlipidemia here for elevated blood sugar for the last few days.  Wife states patient also has been more confused but only at night.  Patient also noted to have shaking and tremors throughout the day.  Patient denies any cough fever chills numbness tingling or weakness no trauma or falls.  Of note patient quit drinking wine 1 week ago usually drinks about 3 to 4 glasses of homemade wine.  Patient denies any chest pain shortness of breath nausea vomiting.  Patient states had diarrhea few days ago now resolved.  PAST MEDICAL & SURGICAL HISTORY:        Medication list         MEDICATIONS  (STANDING):  amLODIPine   Tablet 5 milliGRAM(s) Oral daily  atorvastatin 20 milliGRAM(s) Oral at bedtime  dextrose 5%. 1000 milliLiter(s) (100 mL/Hr) IV Continuous <Continuous>  dextrose 5%. 1000 milliLiter(s) (50 mL/Hr) IV Continuous <Continuous>  dextrose 50% Injectable 25 Gram(s) IV Push once  dextrose 50% Injectable 12.5 Gram(s) IV Push once  dextrose 50% Injectable 25 Gram(s) IV Push once  folic acid 1 milliGRAM(s) Oral daily  glucagon  Injectable 1 milliGRAM(s) IntraMuscular once  heparin   Injectable 5000 Unit(s) SubCutaneous every 8 hours  insulin lispro (ADMELOG) corrective regimen sliding scale   SubCutaneous three times a day before meals  metoprolol tartrate 25 milliGRAM(s) Oral two times a day  multivitamin 1 Tablet(s) Oral daily  piperacillin/tazobactam IVPB.. 3.375 Gram(s) IV Intermittent every 12 hours  sodium chloride 0.9%. 1000 milliLiter(s) (75 mL/Hr) IV Continuous <Continuous>  thiamine 100 milliGRAM(s) Oral daily    MEDICATIONS  (PRN):  dextrose Oral Gel 15 Gram(s) Oral once PRN Blood Glucose LESS THAN 70 milliGRAM(s)/deciliter         Vitals log        ICU Vital Signs Last 24 Hrs  T(C): 36.7 (08 Oct 2022 23:04), Max: 37.2 (08 Oct 2022 12:10)  T(F): 98 (08 Oct 2022 23:04), Max: 99 (08 Oct 2022 12:10)  HR: 77 (08 Oct 2022 23:04) (77 - 99)  BP: 129/75 (08 Oct 2022 23:04) (106/67 - 135/75)  BP(mean): --  ABP: --  ABP(mean): --  RR: 16 (08 Oct 2022 23:04) (16 - 18)  SpO2: 95% (08 Oct 2022 23:04) (93% - 95%)    O2 Parameters below as of 08 Oct 2022 23:04  Patient On (Oxygen Delivery Method): room air         smoker  drinker    born in [place]          Input and Output:  I&O's Detail      Lab Data                        12.1   6.60  )-----------( 282      ( 09 Oct 2022 04:49 )             36.2     10-09    137  |  104  |  62<H>  ----------------------------<  181<H>  4.4   |  23  |  3.40<H>    Ca    9.0      09 Oct 2022 04:49  Phos  3.0     10-09  Mg     2.3     10-09    TPro  6.6  /  Alb  2.3<L>  /  TBili  0.7  /  DBili  x   /  AST  72<H>  /  ALT  89<H>  /  AlkPhos  71  10-09            Review of Systems	  wine drinker  cough  smoker  weak      Objective     Physical Examination  heart s1s2  lung dec BS  abd soft  head nc  verbal        Pertinent Lab findings & Imaging      Lowry:  NO   Adequate UO     I&O's Detail           Discussed with:     Cultures:	        Radiology    ACC: [id number] EXAM:  CT CHEST                          PROCEDURE DATE:  10/08/2022          INTERPRETATION:  CLINICAL INFORMATION: Suspected pneumonia on CT   abdomen/pelvis.    COMPARISON: CT abdomen/pelvis from 10/08/2022 at 5:43 PM.  CT chest,   abdomen and pelvis from 02/05/2018.    CONTRAST/COMPLICATIONS:  IV Contrast: NONE  Oral Contrast: NONE  Complications: None reported at time of study completion    PROCEDURE:  CT of the Chest was performed.  Sagittal and coronal reformats were performed.    FINDINGS:    LUNGS AND AIRWAYS: Patent central airways.  There is patchy groundglass   opacity and more dense airspace opacity in the left upper lobe and   lingula, compatible with pneumonia.  There are numerous scattered 2 mm   nodules in the right upper and right lower lobes, and a few scattered 2   mm nodules in the left lower lobe.  PLEURA: No pleural effusion.  MEDIASTINUM AND LESLIE: Mediastinal lymph nodes measure up to 5 mm short   access in the superior mediastinum on the right side (2:128), 7 mm short   axis in the superior mediastinum on the left side (3:120), 1 cm short   axis in the AP window (3:188) 9 mm short access in the right lower   paratracheal region (3:78), 9 mm short axis in the left lower   paratracheal region (3:195) 9 mm short axis in subcarina region (3:224).    Hilar lymph nodes measure up to 6 mm short access in the left (3:254)  VESSELS: Borderline aneurysmal dilatation of proximal aorta measuring   approximately 4 cm at the sinuses of Valsalva (601:40) and 3.9 cm in the   mid ascending thoracic aorta at the level of the main pulmonary trunk   (601:36).  Scattered atherosclerotic calcifications of the thoracic aorta   and arch vessels.  HEART: Left ventricle appears borderline enlarged.  Moderate to severe   atherosclerotic calcification of the coronary arteries. No pericardial   effusion.  CHEST WALL AND LOWER NECK: Within normal limits.  VISUALIZED UPPER ABDOMEN: Status post cholecystectomy.  Nonspecific   adrenal gland thickening.  BONES: Within normal limits.    IMPRESSION:  Patchy groundglass opacity more dense airspace opacity in the left upper   lobe and lingula, compatible with pneumonia.  Follow-up chest CT is   recommended in 1-3 months to ensure resolution the imaging findings and   to exclude an underlying lung lesion.    Numerous scattered 2 mm nodules in the lungs are of doubtful clinical   significance.  Nonspecific mediastinal and hilar lymph nodes measure up   to 1 cm short axis.  These can be reassessed on follow-up chest CT scan.    Borderline interstitial dilatation of the proximal aorta measuring 4 cm   at the sinuses of Valsalva and 3.9 cm in the mid ascending thoracic aorta   at the level of the main pulmonary trunk.        --- End of Report ---            ROMAINE DALEY MD; Attending Radiologist  This document has been electronically signed. Oct  9 2022 12:58AM

## 2022-10-10 LAB
ALBUMIN SERPL ELPH-MCNC: 2.1 G/DL — LOW (ref 3.3–5)
ALP SERPL-CCNC: 82 U/L — SIGNIFICANT CHANGE UP (ref 40–120)
ALT FLD-CCNC: 134 U/L — HIGH (ref 12–78)
ANION GAP SERPL CALC-SCNC: 14 MMOL/L — SIGNIFICANT CHANGE UP (ref 5–17)
AST SERPL-CCNC: 120 U/L — HIGH (ref 15–37)
B-OH-BUTYR SERPL-SCNC: 1.4 MMOL/L — HIGH
BILIRUB SERPL-MCNC: 0.7 MG/DL — SIGNIFICANT CHANGE UP (ref 0.2–1.2)
BUN SERPL-MCNC: 49 MG/DL — HIGH (ref 7–23)
CALCIUM SERPL-MCNC: 9.2 MG/DL — SIGNIFICANT CHANGE UP (ref 8.5–10.1)
CHLORIDE SERPL-SCNC: 109 MMOL/L — HIGH (ref 96–108)
CO2 SERPL-SCNC: 16 MMOL/L — LOW (ref 22–31)
CREAT SERPL-MCNC: 2.5 MG/DL — HIGH (ref 0.5–1.3)
EGFR: 29 ML/MIN/1.73M2 — LOW
GLUCOSE SERPL-MCNC: 179 MG/DL — HIGH (ref 70–99)
HCT VFR BLD CALC: 37 % — LOW (ref 39–50)
HGB BLD-MCNC: 12.1 G/DL — LOW (ref 13–17)
MAGNESIUM SERPL-MCNC: 2.1 MG/DL — SIGNIFICANT CHANGE UP (ref 1.6–2.6)
MCHC RBC-ENTMCNC: 29.7 PG — SIGNIFICANT CHANGE UP (ref 27–34)
MCHC RBC-ENTMCNC: 32.7 GM/DL — SIGNIFICANT CHANGE UP (ref 32–36)
MCV RBC AUTO: 90.7 FL — SIGNIFICANT CHANGE UP (ref 80–100)
NRBC # BLD: 0 /100 WBCS — SIGNIFICANT CHANGE UP (ref 0–0)
PHOSPHATE SERPL-MCNC: 3 MG/DL — SIGNIFICANT CHANGE UP (ref 2.5–4.5)
PLATELET # BLD AUTO: 282 K/UL — SIGNIFICANT CHANGE UP (ref 150–400)
POTASSIUM SERPL-MCNC: 4.6 MMOL/L — SIGNIFICANT CHANGE UP (ref 3.5–5.3)
POTASSIUM SERPL-SCNC: 4.6 MMOL/L — SIGNIFICANT CHANGE UP (ref 3.5–5.3)
PROT SERPL-MCNC: 6.4 G/DL — SIGNIFICANT CHANGE UP (ref 6–8.3)
RAPID RVP RESULT: SIGNIFICANT CHANGE UP
RBC # BLD: 4.08 M/UL — LOW (ref 4.2–5.8)
RBC # FLD: 14.1 % — SIGNIFICANT CHANGE UP (ref 10.3–14.5)
SARS-COV-2 RNA SPEC QL NAA+PROBE: SIGNIFICANT CHANGE UP
SODIUM SERPL-SCNC: 139 MMOL/L — SIGNIFICANT CHANGE UP (ref 135–145)
WBC # BLD: 6.83 K/UL — SIGNIFICANT CHANGE UP (ref 3.8–10.5)
WBC # FLD AUTO: 6.83 K/UL — SIGNIFICANT CHANGE UP (ref 3.8–10.5)

## 2022-10-10 RX ORDER — AMLODIPINE BESYLATE 2.5 MG/1
1 TABLET ORAL
Qty: 0 | Refills: 0 | DISCHARGE

## 2022-10-10 RX ORDER — SITAGLIPTIN 50 MG/1
1 TABLET, FILM COATED ORAL
Qty: 0 | Refills: 0 | DISCHARGE

## 2022-10-10 RX ORDER — METFORMIN HYDROCHLORIDE 850 MG/1
1 TABLET ORAL
Qty: 0 | Refills: 0 | DISCHARGE

## 2022-10-10 RX ORDER — DAPAGLIFLOZIN 10 MG/1
1 TABLET, FILM COATED ORAL
Qty: 0 | Refills: 0 | DISCHARGE

## 2022-10-10 RX ORDER — METOPROLOL TARTRATE 50 MG
1 TABLET ORAL
Qty: 0 | Refills: 0 | DISCHARGE

## 2022-10-10 RX ORDER — CEFTRIAXONE 500 MG/1
1000 INJECTION, POWDER, FOR SOLUTION INTRAMUSCULAR; INTRAVENOUS EVERY 24 HOURS
Refills: 0 | Status: DISCONTINUED | OUTPATIENT
Start: 2022-10-10 | End: 2022-10-10

## 2022-10-10 RX ORDER — CHOLECALCIFEROL (VITAMIN D3) 125 MCG
1 CAPSULE ORAL
Qty: 0 | Refills: 0 | DISCHARGE

## 2022-10-10 RX ORDER — CEFUROXIME AXETIL 250 MG
500 TABLET ORAL EVERY 12 HOURS
Refills: 0 | Status: DISCONTINUED | OUTPATIENT
Start: 2022-10-10 | End: 2022-10-13

## 2022-10-10 RX ORDER — ATORVASTATIN CALCIUM 80 MG/1
1 TABLET, FILM COATED ORAL
Qty: 0 | Refills: 0 | DISCHARGE

## 2022-10-10 RX ADMIN — Medication 1: at 17:52

## 2022-10-10 RX ADMIN — Medication 500 MILLIGRAM(S): at 18:42

## 2022-10-10 RX ADMIN — Medication 3 UNIT(S): at 08:39

## 2022-10-10 RX ADMIN — Medication 1 TABLET(S): at 11:59

## 2022-10-10 RX ADMIN — SODIUM CHLORIDE 60 MILLILITER(S): 9 INJECTION, SOLUTION INTRAVENOUS at 16:45

## 2022-10-10 RX ADMIN — ATORVASTATIN CALCIUM 20 MILLIGRAM(S): 80 TABLET, FILM COATED ORAL at 21:23

## 2022-10-10 RX ADMIN — SODIUM CHLORIDE 60 MILLILITER(S): 9 INJECTION, SOLUTION INTRAVENOUS at 00:20

## 2022-10-10 RX ADMIN — HEPARIN SODIUM 5000 UNIT(S): 5000 INJECTION INTRAVENOUS; SUBCUTANEOUS at 05:41

## 2022-10-10 RX ADMIN — HEPARIN SODIUM 5000 UNIT(S): 5000 INJECTION INTRAVENOUS; SUBCUTANEOUS at 21:23

## 2022-10-10 RX ADMIN — INSULIN GLARGINE 10 UNIT(S): 100 INJECTION, SOLUTION SUBCUTANEOUS at 22:25

## 2022-10-10 RX ADMIN — Medication 25 MILLIGRAM(S): at 05:41

## 2022-10-10 RX ADMIN — AMLODIPINE BESYLATE 5 MILLIGRAM(S): 2.5 TABLET ORAL at 05:41

## 2022-10-10 RX ADMIN — Medication 1 MILLIGRAM(S): at 11:59

## 2022-10-10 RX ADMIN — Medication 3 UNIT(S): at 17:55

## 2022-10-10 RX ADMIN — Medication 2: at 08:11

## 2022-10-10 RX ADMIN — HEPARIN SODIUM 5000 UNIT(S): 5000 INJECTION INTRAVENOUS; SUBCUTANEOUS at 16:11

## 2022-10-10 RX ADMIN — Medication 2: at 11:59

## 2022-10-10 RX ADMIN — Medication 100 MILLIGRAM(S): at 11:59

## 2022-10-10 RX ADMIN — Medication 1: at 17:54

## 2022-10-10 RX ADMIN — Medication 25 MILLIGRAM(S): at 17:44

## 2022-10-10 RX ADMIN — PIPERACILLIN AND TAZOBACTAM 25 GRAM(S): 4; .5 INJECTION, POWDER, LYOPHILIZED, FOR SOLUTION INTRAVENOUS at 02:06

## 2022-10-10 RX ADMIN — Medication 3 UNIT(S): at 12:02

## 2022-10-10 RX ADMIN — Medication 2: at 17:55

## 2022-10-10 NOTE — PROGRESS NOTE ADULT - SUBJECTIVE AND OBJECTIVE BOX
Patient is a 62y old  Male who presents with a chief complaint of AMS (10 Oct 2022 13:15)    Date of servie : 10-10-22 @ 13:35  INTERVAL HPI/OVERNIGHT EVENTS:  T(C): 36.7 (10-10-22 @ 08:38), Max: 36.7 (10-09-22 @ 17:41)  HR: 69 (10-10-22 @ 08:38) (69 - 83)  BP: 143/71 (10-10-22 @ 08:38) (127/70 - 144/81)  RR: 16 (10-10-22 @ 08:38) (16 - 16)  SpO2: 95% (10-10-22 @ 08:38) (95% - 98%)  Wt(kg): --  I&O's Summary      LABS:                        12.1   6.83  )-----------( 282      ( 10 Oct 2022 06:13 )             37.0     10-10    139  |  109<H>  |  49<H>  ----------------------------<  179<H>  4.6   |  16<L>  |  2.50<H>    Ca    9.2      10 Oct 2022 06:13  Phos  3.0     10-10  Mg     2.1     10-10    TPro  6.4  /  Alb  2.1<L>  /  TBili  0.7  /  DBili  x   /  AST  120<H>  /  ALT  134<H>  /  AlkPhos  82  10-10      Urinalysis Basic - ( 08 Oct 2022 15:00 )    Color: Yellow / Appearance: Clear / S.010 / pH: x  Gluc: x / Ketone: Negative  / Bili: Negative / Urobili: Negative   Blood: x / Protein: 100 / Nitrite: Negative   Leuk Esterase: Negative / RBC: 3-5 /HPF / WBC 0-2   Sq Epi: x / Non Sq Epi: Occasional / Bacteria: x      CAPILLARY BLOOD GLUCOSE      POCT Blood Glucose.: 227 mg/dL (10 Oct 2022 11:51)  POCT Blood Glucose.: 168 mg/dL (10 Oct 2022 07:43)  POCT Blood Glucose.: 162 mg/dL (09 Oct 2022 22:08)  POCT Blood Glucose.: 225 mg/dL (09 Oct 2022 19:34)  POCT Blood Glucose.: 210 mg/dL (09 Oct 2022 18:27)        Urinalysis Basic - ( 08 Oct 2022 15:00 )    Color: Yellow / Appearance: Clear / S.010 / pH: x  Gluc: x / Ketone: Negative  / Bili: Negative / Urobili: Negative   Blood: x / Protein: 100 / Nitrite: Negative   Leuk Esterase: Negative / RBC: 3-5 /HPF / WBC 0-2   Sq Epi: x / Non Sq Epi: Occasional / Bacteria: x        MEDICATIONS  (STANDING):  amLODIPine   Tablet 5 milliGRAM(s) Oral daily  atorvastatin 20 milliGRAM(s) Oral at bedtime  dextrose 5%. 1000 milliLiter(s) (100 mL/Hr) IV Continuous <Continuous>  dextrose 5%. 1000 milliLiter(s) (50 mL/Hr) IV Continuous <Continuous>  dextrose 50% Injectable 25 Gram(s) IV Push once  dextrose 50% Injectable 12.5 Gram(s) IV Push once  dextrose 50% Injectable 25 Gram(s) IV Push once  folic acid 1 milliGRAM(s) Oral daily  glucagon  Injectable 1 milliGRAM(s) IntraMuscular once  heparin   Injectable 5000 Unit(s) SubCutaneous every 8 hours  insulin glargine Injectable (LANTUS) 10 Unit(s) SubCutaneous at bedtime  insulin lispro (ADMELOG) corrective regimen sliding scale   SubCutaneous three times a day before meals  insulin lispro (ADMELOG) corrective regimen sliding scale   SubCutaneous at bedtime  insulin lispro (ADMELOG) corrective regimen sliding scale   SubCutaneous three times a day before meals  insulin lispro Injectable (ADMELOG) 3 Unit(s) SubCutaneous three times a day before meals  metoprolol tartrate 25 milliGRAM(s) Oral two times a day  multivitamin 1 Tablet(s) Oral daily  piperacillin/tazobactam IVPB.. 3.375 Gram(s) IV Intermittent every 12 hours  sodium chloride 0.45%. 1000 milliLiter(s) (60 mL/Hr) IV Continuous <Continuous>  sodium chloride 0.9%. 1000 milliLiter(s) (75 mL/Hr) IV Continuous <Continuous>  thiamine 100 milliGRAM(s) Oral daily    MEDICATIONS  (PRN):  ALBUTerol    90 MICROgram(s) HFA Inhaler 2 Puff(s) Inhalation every 6 hours PRN Shortness of Breath and/or Wheezing  dextrose Oral Gel 15 Gram(s) Oral once PRN Blood Glucose LESS THAN 70 milliGRAM(s)/deciliter  guaiFENesin Oral Liquid (Sugar-Free) 200 milliGRAM(s) Oral every 6 hours PRN Cough          PHYSICAL EXAM:  GENERAL: NAD, well-groomed, well-developed  HEAD:  Atraumatic, Normocephalic  CHEST/LUNG: Clear to percussion bilaterally; No rales, rhonchi, wheezing, or rubs  HEART: Regular rate and rhythm; No murmurs, rubs, or gallops  ABDOMEN: Soft, Nontender, Nondistended; Bowel sounds present  EXTREMITIES:  2+ Peripheral Pulses, No clubbing, cyanosis, or edema  LYMPH: No lymphadenopathy noted  SKIN: No rashes or lesions    Care Discussed with Consultants/Other Providers [ ] YES  [ ] NO

## 2022-10-10 NOTE — PROGRESS NOTE ADULT - SUBJECTIVE AND OBJECTIVE BOX
[facility] DIVISION of INFECTIOUS DISEASE  Daron Bañuelos MD PhD, Carmen Tamayo MD, Nereyda Greenwood MD, Chase Manrique MD, Nikolas Wood MD  and providing coverage with Jyoti Nelson MD  Providing Infectious Disease Consultations at [facility], [facility], [facility], [facility]'s    Office# [phone] to schedule follow up appointments  Answering Service for urgent calls or New Consults [phone]  Cell# to text for urgent issues Daron Bañuelos [phone]     infectious diseases progress note:    KARMA TALAMANTES is a 62y y. o. Male patient    Patient with no new concerns    Allergies    No Known Allergies    Intolerances        ANTIBIOTICS/RELEVANT:  antimicrobials  piperacillin/tazobactam IVPB.. 3.375 Gram(s) IV Intermittent every 12 hours    immunologic:    OTHER:  ALBUTerol    90 MICROgram(s) HFA Inhaler 2 Puff(s) Inhalation every 6 hours PRN  amLODIPine   Tablet 5 milliGRAM(s) Oral daily  atorvastatin 20 milliGRAM(s) Oral at bedtime  dextrose 5%. 1000 milliLiter(s) IV Continuous <Continuous>  dextrose 5%. 1000 milliLiter(s) IV Continuous <Continuous>  dextrose 50% Injectable 25 Gram(s) IV Push once  dextrose 50% Injectable 12.5 Gram(s) IV Push once  dextrose 50% Injectable 25 Gram(s) IV Push once  dextrose Oral Gel 15 Gram(s) Oral once PRN  folic acid 1 milliGRAM(s) Oral daily  glucagon  Injectable 1 milliGRAM(s) IntraMuscular once  guaiFENesin Oral Liquid (Sugar-Free) 200 milliGRAM(s) Oral every 6 hours PRN  heparin   Injectable 5000 Unit(s) SubCutaneous every 8 hours  insulin glargine Injectable (LANTUS) 10 Unit(s) SubCutaneous at bedtime  insulin lispro (ADMELOG) corrective regimen sliding scale   SubCutaneous three times a day before meals  insulin lispro (ADMELOG) corrective regimen sliding scale   SubCutaneous at bedtime  insulin lispro (ADMELOG) corrective regimen sliding scale   SubCutaneous three times a day before meals  insulin lispro Injectable (ADMELOG) 3 Unit(s) SubCutaneous three times a day before meals  metoprolol tartrate 25 milliGRAM(s) Oral two times a day  multivitamin 1 Tablet(s) Oral daily  sodium chloride 0.45%. 1000 milliLiter(s) IV Continuous <Continuous>  sodium chloride 0.9%. 1000 milliLiter(s) IV Continuous <Continuous>  thiamine 100 milliGRAM(s) Oral daily      Objective:  Last 24-Vital Signs Last 24 Hrs  T(C): 36.7 (10 Oct 2022 08:38), Max: 36.7 (09 Oct 2022 17:41)  T(F): 98 (10 Oct 2022 08:38), Max: 98.1 (10 Oct 2022 05:39)  HR: 69 (10 Oct 2022 08:38) (69 - 83)  BP: 143/71 (10 Oct 2022 08:38) (127/70 - 144/81)  BP(mean): --  RR: 16 (10 Oct 2022 08:38) (16 - 16)  SpO2: 95% (10 Oct 2022 08:38) (95% - 98%)    Parameters below as of 10 Oct 2022 08:38  Patient On (Oxygen Delivery Method): room air        T(C): 36.7 (10-10-22 @ 08:38), Max: 36.8 (10-08-22 @ 15:54)  T(F): 98 (10-10-22 @ 08:38), Max: 98.3 (10-08-22 @ 15:54)  T(C): 36.7 (10-10-22 @ 08:38), Max: 37.2 (10-08-22 @ 12:10)  T(F): 98 (10-10-22 @ 08:38), Max: 99 (10-08-22 @ 12:10)  T(C): 36.7 (10-10-22 @ 08:38), Max: 37.2 (10-08-22 @ 12:10)  T(F): 98 (10-10-22 @ 08:38), Max: 99 (10-08-22 @ 12:10)    PHYSICAL EXAM:  Constitutional: Well-developed, well nourished  Eyes: PERRLA, EOMI  Ear/Nose/Throat: oropharynx normal	  Neck: no JVD, no lymphadenopathy, supple  Respiratory: no accessory muscle use, lung fields with asymmetry in BS  Cardiovascular: distant  Gastrointestinal: soft, NT, no HSM, BS-normal  Extremities: no clubbing, no cyanosis, edema absent  Neuro: patient alert, oriented and appropriate  Skin: no sig lesions        LABS:                        12.1   6.83  )-----------( 282      ( 10 Oct 2022 06:13 )             37.0       WBC 6.83  10-10 @ 06:13  WBC 6.60  10-09 @ 04:49  WBC 6.93  10-08 @ 12:25      10-10    139  |  109<H>  |  49<H>  ----------------------------<  179<H>  4.6   |  16<L>  |  2.50<H>    Ca    9.2      10 Oct 2022 06:13  Phos  3.0     10-10  Mg     2.1     10-10    TPro  6.4  /  Alb  2.1<L>  /  TBili  0.7  /  DBili  x   /  AST  120<H>  /  ALT  134<H>  /  AlkPhos  82  10-10      Creatinine, Serum: 2.50 mg/dL (10-10-22 @ 06:13)  Creatinine, Serum: 3.40 mg/dL (10-09-22 @ 04:49)  Creatinine, Serum: 3.40 mg/dL (10-09-22 @ 00:36)  Creatinine, Serum: 3.40 mg/dL (10-08-22 @ 22:19)  Creatinine, Serum: 3.30 mg/dL (10-08-22 @ 22:19)  Creatinine, Serum: 3.90 mg/dL (10-08-22 @ 12:25)        Urinalysis Basic - ( 08 Oct 2022 15:00 )    Color: Yellow / Appearance: Clear / S.010 / pH: x  Gluc: x / Ketone: Negative  / Bili: Negative / Urobili: Negative   Blood: x / Protein: 100 / Nitrite: Negative   Leuk Esterase: Negative / RBC: 3-5 /HPF / WBC 0-2   Sq Epi: x / Non Sq Epi: Occasional / Bacteria: x    MICROBIOLOGY:      RADIOLOGY & ADDITIONAL STUDIES:  < from: CT Chest No Cont (10.08.22 @ 23:47) >    ACC: [id number] EXAM:  CT CHEST                          PROCEDURE DATE:  10/08/2022          INTERPRETATION:  CLINICAL INFORMATION: Suspected pneumonia on CT   abdomen/pelvis.    COMPARISON: CT abdomen/pelvis from 10/08/2022 at 5:43 PM.  CT chest,   abdomen and pelvis from 2018.    CONTRAST/COMPLICATIONS:  IV Contrast: NONE  Oral Contrast: NONE  Complications: None reported at time of study completion    PROCEDURE:  CT of the Chest was performed.  Sagittal and coronal reformats were performed.    FINDINGS:    LUNGS AND AIRWAYS: Patent central airways.  There is patchy groundglass   opacity and more dense airspace opacity in the left upper lobe and   lingula, compatible with pneumonia.  There are numerous scattered 2 mm   nodules in theright upper and right lower lobes, and a few scattered 2   mm nodules in the left lower lobe.  PLEURA: No pleural effusion.  MEDIASTINUM AND LESLIE: Mediastinal lymph nodes measure up to 5 mm short   access in the superior mediastinum on the right side(2:128), 7 mm short   axis in the superior mediastinum on the left side (3:120), 1 cm short   axis in the AP window (3:188) 9 mm short access in the right lower   paratracheal region (3:78), 9 mm short axis in the left lower   paratracheal region (3:195) 9 mm short axis in subcarina region (3:224).    Hilar lymph nodes measure up to 6 mm short access in the left (3:254)  VESSELS: Borderline aneurysmal dilatation of proximal aorta measuring   approximately 4 cm at the sinuses of Valsalva (601:40) and 3.9 cm in the   mid ascending thoracic aorta at the level of the main pulmonary trunk   (601:36).  Scattered atherosclerotic calcifications of the thoracic aorta   and arch vessels.  HEART: Left ventricle appears borderline enlarged.  Moderate to severe   atherosclerotic calcification of the coronary arteries. No pericardial   effusion.  CHEST WALL AND LOWER NECK: Within normal limits.  VISUALIZED UPPER ABDOMEN: Status post cholecystectomy.  Nonspecific   adrenal gland thickening.  BONES: Within normal limits.    IMPRESSION:  Patchy groundglass opacity more dense airspace opacity in the left upper   lobe and lingula, compatible with pneumonia.  Follow-up chest CT is   recommended in 1-3 months to ensure resolution the imaging findings and   to exclude an underlying lung lesion.    Numerous scattered 2 mm nodules in the lungs are of doubtful clinical   significance.  Nonspecific mediastinal and hilar lymph nodes measure up   to 1 cm short axis.  These can be reassessed on follow-up chest CT scan.    Borderline interstitial dilatation of the proximal aorta measuring 4 cm   at the sinuses of Valsalva and 3.9 cm in the mid ascending thoracic aorta   at the level of the main pulmonary trunk.

## 2022-10-10 NOTE — PROGRESS NOTE ADULT - SUBJECTIVE AND OBJECTIVE BOX
[facility] Cardiology Progress Note [phone] (Dr. Quintero, Chanel, Issac, Sabi)    CHIEF COMPLAINT: Patient is a 62y old  Male who presents with a chief complaint of AMS (10 Oct 2022 05:37)      Follow Up Today: The patient denies any chest discomfort or shortness of breath.    HPI:  62-year-old male with past medical hx of diabetes hypertension hyperlipidemia here for elevated blood sugar for the last few days.  Wife states patient also has been more confused but only at night.  Patient also noted to have shaking and tremors throughout the day.  Patient denies any cough fever chills numbness tingling or weakness no trauma or falls.  Of note patient quit drinking wine 1 week ago usually drinks about 3 to 4 glasses of homemade wine.  Patient denies any chest pain shortness of breath nausea vomiting.  Patient states had diarrhea few days ago now resolved. (08 Oct 2022 19:59)      PAST MEDICAL & SURGICAL HISTORY:  Alcohol use      No significant past surgical history          MEDICATIONS  (STANDING):  amLODIPine   Tablet 5 milliGRAM(s) Oral daily  atorvastatin 20 milliGRAM(s) Oral at bedtime  dextrose 5%. 1000 milliLiter(s) (100 mL/Hr) IV Continuous <Continuous>  dextrose 5%. 1000 milliLiter(s) (50 mL/Hr) IV Continuous <Continuous>  dextrose 50% Injectable 25 Gram(s) IV Push once  dextrose 50% Injectable 12.5 Gram(s) IV Push once  dextrose 50% Injectable 25 Gram(s) IV Push once  folic acid 1 milliGRAM(s) Oral daily  glucagon  Injectable 1 milliGRAM(s) IntraMuscular once  heparin   Injectable 5000 Unit(s) SubCutaneous every 8 hours  insulin glargine Injectable (LANTUS) 10 Unit(s) SubCutaneous at bedtime  insulin lispro (ADMELOG) corrective regimen sliding scale   SubCutaneous three times a day before meals  insulin lispro (ADMELOG) corrective regimen sliding scale   SubCutaneous at bedtime  insulin lispro (ADMELOG) corrective regimen sliding scale   SubCutaneous three times a day before meals  insulin lispro Injectable (ADMELOG) 3 Unit(s) SubCutaneous three times a day before meals  metoprolol tartrate 25 milliGRAM(s) Oral two times a day  multivitamin 1 Tablet(s) Oral daily  piperacillin/tazobactam IVPB.. 3.375 Gram(s) IV Intermittent every 12 hours  sodium chloride 0.45%. 1000 milliLiter(s) (60 mL/Hr) IV Continuous <Continuous>  sodium chloride 0.9%. 1000 milliLiter(s) (75 mL/Hr) IV Continuous <Continuous>  thiamine 100 milliGRAM(s) Oral daily    MEDICATIONS  (PRN):  ALBUTerol    90 MICROgram(s) HFA Inhaler 2 Puff(s) Inhalation every 6 hours PRN Shortness of Breath and/or Wheezing  dextrose Oral Gel 15 Gram(s) Oral once PRN Blood Glucose LESS THAN 70 milliGRAM(s)/deciliter  guaiFENesin Oral Liquid (Sugar-Free) 200 milliGRAM(s) Oral every 6 hours PRN Cough      Allergies    No Known Allergies    Intolerances        REVIEW OF SYSTEMS:    All other review of systems is negative unless indicated above    Vital Signs Last 24 Hrs  T(C): 36.7 (10 Oct 2022 08:38), Max: 36.7 (09 Oct 2022 17:41)  T(F): 98 (10 Oct 2022 08:38), Max: 98.1 (10 Oct 2022 05:39)  HR: 69 (10 Oct 2022 08:38) (69 - 83)  BP: 143/71 (10 Oct 2022 08:38) (122/69 - 144/81)  BP(mean): --  RR: 16 (10 Oct 2022 08:38) (16 - 16)  SpO2: 95% (10 Oct 2022 08:38) (94% - 98%)    Parameters below as of 10 Oct 2022 08:38  Patient On (Oxygen Delivery Method): room air        I&O's Summary      PHYSICAL EXAM:    Constitutional: NAD, awake and alert, well-developed  Eyes:  EOMI,  Pupils round, No oral cyanosis.  HEENT: No exudate or erythema  Pulmonary: Non-labored, breath sounds are clear bilaterally, No wheezing, rales or rhonchi  Cardiovascular: Regular, S1 and S2, No murmurs  Gastrointestinal: Bowel Sounds present, soft, nontender.   Ext: No significant LE edema  Neurological: Alert, no gross focal motor deficits  Skin: No rashes.  Psych:  Mood & affect appropriate    LABS: All Labs Reviewed:                        12.1   6.83  )-----------( 282      ( 10 Oct 2022 06:13 )             37.0                         12.1   6.60  )-----------( 282      ( 09 Oct 2022 04:49 )             36.2                         13.5   6.93  )-----------( 263      ( 08 Oct 2022 12:25 )             39.9     10 Oct 2022 06:13    139    |  109    |  49     ----------------------------<  179    4.6     |  16     |  2.50   09 Oct 2022 04:49    137    |  104    |  62     ----------------------------<  181    4.4     |  23     |  3.40   09 Oct 2022 00:36    137    |  104    |  65     ----------------------------<  193    4.4     |  23     |  3.40     Ca    9.2        10 Oct 2022 06:13  Ca    9.0        09 Oct 2022 04:49  Ca    9.2        09 Oct 2022 00:36  Phos  3.0       10 Oct 2022 06:13  Phos  3.0       09 Oct 2022 00:36  Phos  3.1       08 Oct 2022 22:19  Mg     2.1       10 Oct 2022 06:13  Mg     2.3       09 Oct 2022 00:36  Mg     2.2       08 Oct 2022 22:19    TPro  6.4    /  Alb  2.1    /  TBili  0.7    /  DBili  x      /  AST  120    /  ALT  134    /  AlkPhos  82     10 Oct 2022 06:13  TPro  6.6    /  Alb  2.3    /  TBili  0.7    /  DBili  x      /  AST  72     /  ALT  89     /  AlkPhos  71     09 Oct 2022 04:49  TPro  6.5    /  Alb  2.3    /  TBili  0.6    /  DBili  0.2    /  AST  65     /  ALT  81     /  AlkPhos  68     08 Oct 2022 22:19          Blood Culture: Organism --  Gram Stain Blood -- Gram Stain --  Specimen Source Clean Catch Clean Catch (Midstream)  Culture-Blood --    Organism --  Gram Stain Blood -- Gram Stain --  Specimen Source .Blood Blood  Culture-Blood --    Organism --  Gram Stain Blood -- Gram Stain --  Specimen Source .Blood Blood  Culture-Blood --            RADIOLOGY/EKG:    < from: CT Chest No Cont (10.08.22 @ 23:47) >  IMPRESSION:  Patchy groundglass opacity more dense airspace opacity in the left upper   lobe and lingula, compatible with pneumonia.  Follow-up chest CT is   recommended in 1-3 months to ensure resolution the imaging findings and   to exclude an underlying lung lesion.    Numerous scattered 2 mm nodules in the lungs are of doubtful clinical   significance.  Nonspecific mediastinal and hilar lymph nodes measure up   to 1 cm short axis.  These can be reassessed on follow-up chest CT scan.    Borderline interstitial dilatation of the proximal aorta measuring 4 cm   at the sinuses of Valsalva and 3.9 cm in the mid ascending thoracic aorta   at the level of the main pulmonary trunk.    < end of copied text >      Attending Attestation:   20 minutes spent on total encounter; more than 50% of the visit was spent counseling and/or coordinating care by the attending physician.     ASSESSMENT AND PLAN

## 2022-10-10 NOTE — PROGRESS NOTE ADULT - SUBJECTIVE AND OBJECTIVE BOX
Patient is a 62y old  Male who presents with a chief complaint of AMS (10 Oct 2022 09:16)    Patient seen in follow up for ADRIANNA on CKD.        PAST MEDICAL HISTORY:  Alcohol use      MEDICATIONS  (STANDING):  amLODIPine   Tablet 5 milliGRAM(s) Oral daily  atorvastatin 20 milliGRAM(s) Oral at bedtime  dextrose 5%. 1000 milliLiter(s) (100 mL/Hr) IV Continuous <Continuous>  dextrose 5%. 1000 milliLiter(s) (50 mL/Hr) IV Continuous <Continuous>  dextrose 50% Injectable 25 Gram(s) IV Push once  dextrose 50% Injectable 12.5 Gram(s) IV Push once  dextrose 50% Injectable 25 Gram(s) IV Push once  folic acid 1 milliGRAM(s) Oral daily  glucagon  Injectable 1 milliGRAM(s) IntraMuscular once  heparin   Injectable 5000 Unit(s) SubCutaneous every 8 hours  insulin glargine Injectable (LANTUS) 10 Unit(s) SubCutaneous at bedtime  insulin lispro (ADMELOG) corrective regimen sliding scale   SubCutaneous three times a day before meals  insulin lispro (ADMELOG) corrective regimen sliding scale   SubCutaneous at bedtime  insulin lispro (ADMELOG) corrective regimen sliding scale   SubCutaneous three times a day before meals  insulin lispro Injectable (ADMELOG) 3 Unit(s) SubCutaneous three times a day before meals  metoprolol tartrate 25 milliGRAM(s) Oral two times a day  multivitamin 1 Tablet(s) Oral daily  piperacillin/tazobactam IVPB.. 3.375 Gram(s) IV Intermittent every 12 hours  sodium chloride 0.45%. 1000 milliLiter(s) (60 mL/Hr) IV Continuous <Continuous>  sodium chloride 0.9%. 1000 milliLiter(s) (75 mL/Hr) IV Continuous <Continuous>  thiamine 100 milliGRAM(s) Oral daily    MEDICATIONS  (PRN):  ALBUTerol    90 MICROgram(s) HFA Inhaler 2 Puff(s) Inhalation every 6 hours PRN Shortness of Breath and/or Wheezing  dextrose Oral Gel 15 Gram(s) Oral once PRN Blood Glucose LESS THAN 70 milliGRAM(s)/deciliter  guaiFENesin Oral Liquid (Sugar-Free) 200 milliGRAM(s) Oral every 6 hours PRN Cough    T(C): 36.7 (10-10-22 @ 08:38), Max: 36.7 (10-09-22 @ 07:15)  HR: 69 (10-10-22 @ 08:38) (69 - 83)  BP: 143/71 (10-10-22 @ 08:38) (122/69 - 144/81)  RR: 16 (10-10-22 @ 08:38) (16 - 18)  SpO2: 95% (10-10-22 @ 08:38) (94% - 98%)  Wt(kg): --  I&O's Detail      PHYSICAL EXAM:  General: No distress  Respiratory: b/l air entry  Cardiovascular: S1 S2  Gastrointestinal: soft  Extremities:  no edema                              12.1   6.83  )-----------( 282      ( 10 Oct 2022 06:13 )             37.0     10-10    139  |  109<H>  |  49<H>  ----------------------------<  179<H>  4.6   |  16<L>  |  2.50<H>    Ca    9.2      10 Oct 2022 06:13  Phos  3.0     10-10  Mg     2.1     10-10    TPro  6.4  /  Alb  2.1<L>  /  TBili  0.7  /  DBili  x   /  AST  120<H>  /  ALT  134<H>  /  AlkPhos  82  10-10        LIVER FUNCTIONS - ( 10 Oct 2022 06:13 )  Alb: 2.1 g/dL / Pro: 6.4 g/dL / ALK PHOS: 82 U/L / ALT: 134 U/L / AST: 120 U/L / GGT: x           Urinalysis Basic - ( 08 Oct 2022 15:00 )    Color: Yellow / Appearance: Clear / S.010 / pH: x  Gluc: x / Ketone: Negative  / Bili: Negative / Urobili: Negative   Blood: x / Protein: 100 / Nitrite: Negative   Leuk Esterase: Negative / RBC: 3-5 /HPF / WBC 0-2   Sq Epi: x / Non Sq Epi: Occasional / Bacteria: x        Sodium, Serum: 139 (10-10 @ 06:13)  Sodium, Serum: 137 (10-09 @ 04:49)  Sodium, Serum: 137 (10-09 @ 00:36)  Sodium, Serum: 134 (10-08 @ 22:19)    Creatinine, Serum: 2.50 (10-10 @ 06:13)  Creatinine, Serum: 3.40 (10-09 @ 04:49)  Creatinine, Serum: 3.40 (10-09 @ 00:36)  Creatinine, Serum: 3.40 (10-08 @ 22:19)  Creatinine, Serum: 3.90 (10-08 @ 12:25)    Potassium, Serum: 4.6 (10-10 @ 06:13)  Potassium, Serum: 4.4 (10-09 @ 04:49)  Potassium, Serum: 4.4 (10-09 @ 00:36)  Potassium, Serum: 4.4 (10-08 @ 22:19)    Hemoglobin: 12.1 (10-10 @ :13)  Hemoglobin: 12.1 (10-09 @ 04:49)  Hemoglobin: 13.5 (10-08 @ 12:25)        < from: US Kidney and Bladder (10.09.22 @ 17:59) >    ACC: [id number] EXAM:  US KIDNEYS AND BLADDER                          PROCEDURE DATE:  10/09/2022          INTERPRETATION:  CLINICAL INFORMATION: Renal insufficiency.    COMPARISON: Abdominal CT dated 10/08/2022    TECHNIQUE: Sonography of the kidneys and bladder.    FINDINGS:  Right kidney: 11.0 cm. No suspicious renal mass, hydronephrosis or   calculi. There is 2.5 cm simple appearing cyst in the lower pole.    Left kidney: 11.0 cm. No suspicious renal mass, hydronephrosis or   calculi. There is 1.5 cm simple appearing cyst in the lower pole and 1.0   cm simple appearing cortical cyst in the upper pole.    Urinary bladder: Mild bladder wall hypertrophy. No stones or lesions   identified. Prevoid volume of 180 cc. Postvoid images were not obtained.    IMPRESSION:  No hydronephrosis.    Bilateral simple appearing renal cysts.        --- End of Report ---            KENNY STILL MD; Attending Radiologist  This document has been electronically signed. Oct  9 2022  7:28PM    < end of copied text >

## 2022-10-10 NOTE — PROGRESS NOTE ADULT - ASSESSMENT
62-year-old male with past medical hx of diabetes hypertension hyperlipidemia    ams  hld  htn  pna  acute infection  DM  GGO on CT - Pulm nodules on CT  Smoker  wine drinker - ROSE - Etoh use disorder    ID - Cardio - Renal eval noted  US renal - cysts  IVF in progress  on Zosyn -   vs noted -     ct reviewed with pt and wife  tx for poss PNA - Lower resp tract infection  ID eval  cx - biomarkers  pt will need follow up CT imaging in 6 - 8 weeks - eval resolution of GGO and Pulm Nodules and Meds LN - in a smoker  pt will need PFT as outpatient - and Pulm follow up  smoking cess ed  bronchodilators  cough rx regimen  ROSE - education - counseling - emotional support  replete lytes  dvt p  monitor VS and Sat

## 2022-10-10 NOTE — PROGRESS NOTE ADULT - ASSESSMENT
62-year-old male with past medical hx of diabetes hypertension hyperlipidemia here for elevated blood sugar for the last few days.  Wife states patient also has been more confused but only at night.  Patient also noted to have shaking and tremors throughout the day.  Patient denies any cough fever chills numbness tingling or weakness no trauma or falls.  Of note patient quit drinking wine 1 week ago usually drinks about 3 to 4 glasses of homemade wine.  Patient denies any chest pain shortness of breath nausea vomiting.  Patient states had diarrhea few days ago now resolved.    1 PNA with metabolic encephalopathy POA   - cw abx  - id fu   - will monitor    2 Uncontrolled dm  - endo fu appreciated   - monitor FS  - ISS    3 HTN  - cw home meds  - dash diet    4 HLD  - cw statin    5 Alcohol use   - stopped drinking  - [facility] protocol  - low risk of withdrawal      case dw family at length

## 2022-10-10 NOTE — PROGRESS NOTE ADULT - ASSESSMENT
62-year-old male with past medical hx of diabetes hypertension hyperlipidemia here for elevated blood sugar for the last few days.  Wife states patient also has been more confused but only at night.  Patient also noted to have shaking and tremors throughout the day.  Patient has been coughing now for several days and the wife reports she just thought he had a cold.  had diarrhea few days ago now resolved.     RECOMMENDATIONS  Cough, abn lung exam and imaging, concerning for PNA and prodrome with diarrhea suggesting this may be post viral, legionella, or typical bacterial process. Hyperglycemia may be due to acute infection.   -Zosyn started 10/8-early am  10/10-will change to Ceftriaxone and as pt improves can look at dc on cefuroxime 500mg PO BID with last day 10/12  -ordered RVP, urine legionella, sputum, nares MRSA still pending collection    recs to follow    Thank you for consulting us and involving us in the management of this most interesting and challenging case.  We will follow along in the care of this patient. Please call us at [phone] or text me directly on my cell# at [phone] with any concerns.

## 2022-10-10 NOTE — PROGRESS NOTE ADULT - ASSESSMENT
62M with DM2, HTN, HLD, CKD a/w shaking/sob found with NING PNA    ams  hld  htn  pna  acute infection  DM    Suggest:    1. NING PNA  - C/w IV antibiotics - Zosyn IV    2. HTN  - Continue with amlodipine 5mg daily  - Continue with metoprolol 25mg twice daily.    3.Hyperlipidemia  - C/w Lipitor     4. DVT prophylaxis with Heparin SQ

## 2022-10-10 NOTE — PROGRESS NOTE ADULT - SUBJECTIVE AND OBJECTIVE BOX
Date/Time Patient Seen:  		  Referring MD:   Data Reviewed	       Patient is a 62y old  Male who presents with a chief complaint of AMS (09 Oct 2022 13:51)      Subjective/HPI     PAST MEDICAL & SURGICAL HISTORY:  Alcohol use    No significant past surgical history          Medication list         MEDICATIONS  (STANDING):  amLODIPine   Tablet 5 milliGRAM(s) Oral daily  atorvastatin 20 milliGRAM(s) Oral at bedtime  dextrose 5%. 1000 milliLiter(s) (100 mL/Hr) IV Continuous <Continuous>  dextrose 5%. 1000 milliLiter(s) (50 mL/Hr) IV Continuous <Continuous>  dextrose 50% Injectable 25 Gram(s) IV Push once  dextrose 50% Injectable 12.5 Gram(s) IV Push once  dextrose 50% Injectable 25 Gram(s) IV Push once  folic acid 1 milliGRAM(s) Oral daily  glucagon  Injectable 1 milliGRAM(s) IntraMuscular once  heparin   Injectable 5000 Unit(s) SubCutaneous every 8 hours  insulin glargine Injectable (LANTUS) 10 Unit(s) SubCutaneous at bedtime  insulin lispro (ADMELOG) corrective regimen sliding scale   SubCutaneous three times a day before meals  insulin lispro (ADMELOG) corrective regimen sliding scale   SubCutaneous at bedtime  insulin lispro (ADMELOG) corrective regimen sliding scale   SubCutaneous three times a day before meals  insulin lispro Injectable (ADMELOG) 3 Unit(s) SubCutaneous three times a day before meals  metoprolol tartrate 25 milliGRAM(s) Oral two times a day  multivitamin 1 Tablet(s) Oral daily  piperacillin/tazobactam IVPB.. 3.375 Gram(s) IV Intermittent every 12 hours  sodium chloride 0.45%. 1000 milliLiter(s) (60 mL/Hr) IV Continuous <Continuous>  sodium chloride 0.9%. 1000 milliLiter(s) (75 mL/Hr) IV Continuous <Continuous>  thiamine 100 milliGRAM(s) Oral daily    MEDICATIONS  (PRN):  ALBUTerol    90 MICROgram(s) HFA Inhaler 2 Puff(s) Inhalation every 6 hours PRN Shortness of Breath and/or Wheezing  dextrose Oral Gel 15 Gram(s) Oral once PRN Blood Glucose LESS THAN 70 milliGRAM(s)/deciliter  guaiFENesin Oral Liquid (Sugar-Free) 200 milliGRAM(s) Oral every 6 hours PRN Cough         Vitals log        ICU Vital Signs Last 24 Hrs  T(C): 36.5 (10 Oct 2022 00:18), Max: 36.7 (09 Oct 2022 07:15)  T(F): 97.7 (10 Oct 2022 00:18), Max: 98 (09 Oct 2022 07:15)  HR: 76 (10 Oct 2022 00:18) (76 - 83)  BP: 143/78 (10 Oct 2022 00:18) (122/69 - 143/78)  BP(mean): --  ABP: --  ABP(mean): --  RR: 16 (10 Oct 2022 00:18) (16 - 18)  SpO2: 97% (10 Oct 2022 00:18) (94% - 97%)    O2 Parameters below as of 10 Oct 2022 00:18  Patient On (Oxygen Delivery Method): room air                 Input and Output:  I&O's Detail      Lab Data                        12.1   6.60  )-----------( 282      ( 09 Oct 2022 04:49 )             36.2     10-09    137  |  104  |  62<H>  ----------------------------<  181<H>  4.4   |  23  |  3.40<H>    Ca    9.0      09 Oct 2022 04:49  Phos  3.0     10-09  Mg     2.3     10-09    TPro  6.6  /  Alb  2.3<L>  /  TBili  0.7  /  DBili  x   /  AST  72<H>  /  ALT  89<H>  /  AlkPhos  71  10-09            Review of Systems	      Objective     Physical Examination    heart s1s2  lung dec BS  head nc      Pertinent Lab findings & Imaging      Essence:  NO   Adequate UO     I&O's Detail           Discussed with:     Cultures:	        Radiology

## 2022-10-10 NOTE — PHARMACOTHERAPY INTERVENTION NOTE - COMMENTS
Patient is on ceftriaxone 1000 g every 24h for PNA. Discussed with Dr. Bañuelos switching to PO antibiotics with cefuroxime 500 mg every 12h since patient is tolerating PO and clinically improving. MD agreed and order was entered

## 2022-10-10 NOTE — PROGRESS NOTE ADULT - SUBJECTIVE AND OBJECTIVE BOX
Neurology Follow up note    KARMA [id number]    HPI:  62-year-old male with past medical hx of diabetes hypertension hyperlipidemia here for elevated blood sugar for the last few days.  Wife states patient also has been more confused but only at night.  Patient also noted to have shaking and tremors throughout the day.  Patient denies any cough fever chills numbness tingling or weakness no trauma or falls.  Of note patient quit drinking wine 1 week ago usually drinks about 3 to 4 glasses of homemade wine.  Patient denies any chest pain shortness of breath nausea vomiting.  Patient states had diarrhea few days ago now resolved. (08 Oct 2022 19:59)      Interval History -no new eeevents    Patient is seen, chart was reviewed and case was discussed with the treatment team.  Pt is not in any distress.   Lying on bed comfortably.       Vital Signs Last 24 Hrs  T(C): 36.7 (10 Oct 2022 08:38), Max: 36.7 (09 Oct 2022 17:41)  T(F): 98 (10 Oct 2022 08:38), Max: 98.1 (10 Oct 2022 05:39)  HR: 69 (10 Oct 2022 08:38) (69 - 83)  BP: 143/71 (10 Oct 2022 08:38) (127/70 - 144/81)  BP(mean): --  RR: 16 (10 Oct 2022 08:38) (16 - 16)  SpO2: 95% (10 Oct 2022 08:38) (95% - 98%)    Parameters below as of 10 Oct 2022 08:38  Patient On (Oxygen Delivery Method): room air            REVIEW OF SYSTEMS:    Constitutional: No fever, weight loss or fatigue  Eyes: No eye pain, visual disturbances, or discharge  ENT:  No difficulty hearing, tinnitus, vertigo; No sinus or throat pain  Neck: No pain or stiffness  Respiratory: No cough, wheezing, chills or hemoptysis  Cardiovascular: No chest pain, palpitations, shortness of breath, dizziness or leg swelling  Gastrointestinal: No abdominal or epigastric pain. No nausea, vomiting or hematemesis;  Genitourinary: No dysuria, frequency, hematuria or incontinence  Neurological: No headaches, memory loss, loss of strength, numbness or tremors  Psychiatric: No depression, anxiety, mood swings or difficulty sleeping  Musculoskeletal: No joint pain or swelling; No muscle, back or extremity pain  Skin: No itching, burning, rashes or lesions   Lymph Nodes: No enlarged glands  Endocrine: No heat or cold intolerance; No hair loss,  Allergy and Immunologic: No hives or eczema    On Neurological Examination:    Mental Status - Pt is alert, awake, oriented X3.  Follows commands well and able to answer questions appropriately.Mood and affect  normal    Speech -  Normal.    Cranial Nerves - Pupils 3 mm equal and reactive to light, extraocular eye movements intact. Pt has no visual field deficit.  Pt has no  facial asymmetry. Facial sensation is intact.Tongue - is in midline.    Muscle tone - is kaushik      Motor Exam - 5/5 all over, No drift. No shaking or tremors.    Sensory Exam -  Pt withdraws all extremities equally on stimulation. No asymmetry seen. No complaints of tingling, numbness.    coordination:    Finger to nose: normal    Deep tendon Reflexes - 2 plus all over.    Neck Supple -  Yes.     MEDICATIONS    ALBUTerol    90 MICROgram(s) HFA Inhaler 2 Puff(s) Inhalation every 6 hours PRN  amLODIPine   Tablet 5 milliGRAM(s) Oral daily  atorvastatin 20 milliGRAM(s) Oral at bedtime  dextrose 5%. 1000 milliLiter(s) IV Continuous <Continuous>  dextrose 5%. 1000 milliLiter(s) IV Continuous <Continuous>  dextrose 50% Injectable 25 Gram(s) IV Push once  dextrose 50% Injectable 12.5 Gram(s) IV Push once  dextrose 50% Injectable 25 Gram(s) IV Push once  dextrose Oral Gel 15 Gram(s) Oral once PRN  folic acid 1 milliGRAM(s) Oral daily  glucagon  Injectable 1 milliGRAM(s) IntraMuscular once  guaiFENesin Oral Liquid (Sugar-Free) 200 milliGRAM(s) Oral every 6 hours PRN  heparin   Injectable 5000 Unit(s) SubCutaneous every 8 hours  insulin glargine Injectable (LANTUS) 10 Unit(s) SubCutaneous at bedtime  insulin lispro (ADMELOG) corrective regimen sliding scale   SubCutaneous three times a day before meals  insulin lispro (ADMELOG) corrective regimen sliding scale   SubCutaneous at bedtime  insulin lispro (ADMELOG) corrective regimen sliding scale   SubCutaneous three times a day before meals  insulin lispro Injectable (ADMELOG) 3 Unit(s) SubCutaneous three times a day before meals  metoprolol tartrate 25 milliGRAM(s) Oral two times a day  multivitamin 1 Tablet(s) Oral daily  piperacillin/tazobactam IVPB.. 3.375 Gram(s) IV Intermittent every 12 hours  sodium chloride 0.45%. 1000 milliLiter(s) IV Continuous <Continuous>  sodium chloride 0.9%. 1000 milliLiter(s) IV Continuous <Continuous>  thiamine 100 milliGRAM(s) Oral daily      Allergies    No Known Allergies    Intolerances        LABS:  CBC Full  -  ( 10 Oct 2022 06:13 )  WBC Count : 6.83 K/uL  RBC Count : 4.08 M/uL  Hemoglobin : 12.1 g/dL  Hematocrit : 37.0 %  Platelet Count - Automated : 282 K/uL  Mean Cell Volume : 90.7 fl  Mean Cell Hemoglobin : 29.7 pg  Mean Cell Hemoglobin Concentration : 32.7 gm/dL  Auto Neutrophil # : x  Ax    Urinalysis Basic - ( 08 Oct 2022 15:00 )    Color: Yellow / Appearance: Clear / S.010 / pH: x  Gluc: x / Ketone: Negative  / Bili: Negative / Urobili: Negative   Blood: x / Protein: 100 / Nitrite: Negative   Leuk Esterase: Negative / RBC: 3-5 /HPF / WBC 0-2   Sq Epi: x / Non Sq Epi: Occasional / Bacteria: x      10-10    139  |  109<H>  |  49<H>  ----------------------------<  179<H>  4.6   |  16<L>  |  2.50<H>    Ca    9.2      10 Oct 2022 06:13  Phos  3.0     10-10  Mg     2.1     10-10    TPro  6.4  /  Alb  2.1<L>  /  TBili  0.7  /  DBili  x   /  AST  120<H>  /  ALT  134<H>  /  AlkPhos  82  10-10    Hemoglobin A1C:     Vitamin B12     RADIOLOGY    ASSESSMENT AND PLAN:      seen for ams /tremor  likely metabolic encephalopathy  doubt cva    close monitoring of glucose /bun/creat  Physical therapy evaluation.  OOB to chair/ambulation with assistance only  Pain is accessed and addressed.  Would continue to follow.

## 2022-10-10 NOTE — PROGRESS NOTE ADULT - ASSESSMENT
ADRIANNA on CKD 3: Prerenal azotemia, R/o retention  Pneumonia  Hypertension  Diabetes    10/09/22: Will follow creatinine trend. Gentle IV hydration. On Rx for pneumonia. Will get kidney and bladder sonogram.   Monitor BP trend. Titrate BP meds as needed. Monitor blood sugar levels. Insulin coverage as needed. Dietary restriction.   Discussed with patients wife over the phone. Avoid nephrotoxic meds as possible. Avoid ACEI, ARB, NSAIDs and IV contrast.   10/10/22: Improving renal indices. Renal sonogram results noted. Discussed with patients wife at the bedside.  ADRIANNA on CKD 3: Prerenal azotemia, R/o retention  Pneumonia  Hypertension  Diabetes    10/09/22: Will follow creatinine trend. Gentle IV hydration. On Rx for pneumonia. Will get kidney and bladder sonogram.   Monitor BP trend. Titrate BP meds as needed. Monitor blood sugar levels. Insulin coverage as needed. Dietary restriction.   Discussed with patients wife over the phone. Avoid nephrotoxic meds as possible. Avoid ACEI, ARB, NSAIDs and IV contrast.   10/10/22: Improving renal indices. Renal sonogram results noted. To continue current meds. Discussed with patients wife at the bedside.

## 2022-10-11 LAB
ANION GAP SERPL CALC-SCNC: 10 MMOL/L — SIGNIFICANT CHANGE UP (ref 5–17)
BUN SERPL-MCNC: 37 MG/DL — HIGH (ref 7–23)
CALCIUM SERPL-MCNC: 9.5 MG/DL — SIGNIFICANT CHANGE UP (ref 8.5–10.1)
CHLORIDE SERPL-SCNC: 111 MMOL/L — HIGH (ref 96–108)
CO2 SERPL-SCNC: 20 MMOL/L — LOW (ref 22–31)
CREAT SERPL-MCNC: 2 MG/DL — HIGH (ref 0.5–1.3)
EGFR: 38 ML/MIN/1.73M2 — LOW
GLUCOSE SERPL-MCNC: 150 MG/DL — HIGH (ref 70–99)
GRAM STN FLD: SIGNIFICANT CHANGE UP
HCT VFR BLD CALC: 37.1 % — LOW (ref 39–50)
HGB BLD-MCNC: 12.2 G/DL — LOW (ref 13–17)
LEGIONELLA AG UR QL: NEGATIVE — SIGNIFICANT CHANGE UP
MCHC RBC-ENTMCNC: 29.7 PG — SIGNIFICANT CHANGE UP (ref 27–34)
MCHC RBC-ENTMCNC: 32.9 GM/DL — SIGNIFICANT CHANGE UP (ref 32–36)
MCV RBC AUTO: 90.3 FL — SIGNIFICANT CHANGE UP (ref 80–100)
MRSA PCR RESULT.: SIGNIFICANT CHANGE UP
NRBC # BLD: 0 /100 WBCS — SIGNIFICANT CHANGE UP (ref 0–0)
PLATELET # BLD AUTO: 477 K/UL — HIGH (ref 150–400)
POTASSIUM SERPL-MCNC: 3.9 MMOL/L — SIGNIFICANT CHANGE UP (ref 3.5–5.3)
POTASSIUM SERPL-SCNC: 3.9 MMOL/L — SIGNIFICANT CHANGE UP (ref 3.5–5.3)
RBC # BLD: 4.11 M/UL — LOW (ref 4.2–5.8)
RBC # FLD: 14.1 % — SIGNIFICANT CHANGE UP (ref 10.3–14.5)
S AUREUS DNA NOSE QL NAA+PROBE: SIGNIFICANT CHANGE UP
SODIUM SERPL-SCNC: 141 MMOL/L — SIGNIFICANT CHANGE UP (ref 135–145)
SPECIMEN SOURCE: SIGNIFICANT CHANGE UP
WBC # BLD: 6.66 K/UL — SIGNIFICANT CHANGE UP (ref 3.8–10.5)
WBC # FLD AUTO: 6.66 K/UL — SIGNIFICANT CHANGE UP (ref 3.8–10.5)

## 2022-10-11 RX ORDER — INSULIN LISPRO 100/ML
VIAL (ML) SUBCUTANEOUS
Refills: 0 | Status: DISCONTINUED | OUTPATIENT
Start: 2022-10-11 | End: 2022-10-13

## 2022-10-11 RX ADMIN — SODIUM CHLORIDE 60 MILLILITER(S): 9 INJECTION, SOLUTION INTRAVENOUS at 05:29

## 2022-10-11 RX ADMIN — Medication 1 TABLET(S): at 12:09

## 2022-10-11 RX ADMIN — Medication 100 MILLIGRAM(S): at 12:09

## 2022-10-11 RX ADMIN — Medication 25 MILLIGRAM(S): at 17:24

## 2022-10-11 RX ADMIN — Medication 3 UNIT(S): at 12:48

## 2022-10-11 RX ADMIN — ATORVASTATIN CALCIUM 20 MILLIGRAM(S): 80 TABLET, FILM COATED ORAL at 22:06

## 2022-10-11 RX ADMIN — Medication 3 UNIT(S): at 17:24

## 2022-10-11 RX ADMIN — SODIUM CHLORIDE 60 MILLILITER(S): 9 INJECTION, SOLUTION INTRAVENOUS at 08:28

## 2022-10-11 RX ADMIN — HEPARIN SODIUM 5000 UNIT(S): 5000 INJECTION INTRAVENOUS; SUBCUTANEOUS at 22:06

## 2022-10-11 RX ADMIN — Medication 25 MILLIGRAM(S): at 05:22

## 2022-10-11 RX ADMIN — Medication 500 MILLIGRAM(S): at 17:25

## 2022-10-11 RX ADMIN — Medication 6: at 18:26

## 2022-10-11 RX ADMIN — INSULIN GLARGINE 10 UNIT(S): 100 INJECTION, SOLUTION SUBCUTANEOUS at 22:07

## 2022-10-11 RX ADMIN — Medication 3 UNIT(S): at 08:29

## 2022-10-11 RX ADMIN — Medication 500 MILLIGRAM(S): at 05:21

## 2022-10-11 RX ADMIN — HEPARIN SODIUM 5000 UNIT(S): 5000 INJECTION INTRAVENOUS; SUBCUTANEOUS at 14:12

## 2022-10-11 RX ADMIN — SODIUM CHLORIDE 60 MILLILITER(S): 9 INJECTION, SOLUTION INTRAVENOUS at 22:08

## 2022-10-11 RX ADMIN — AMLODIPINE BESYLATE 5 MILLIGRAM(S): 2.5 TABLET ORAL at 05:21

## 2022-10-11 RX ADMIN — HEPARIN SODIUM 5000 UNIT(S): 5000 INJECTION INTRAVENOUS; SUBCUTANEOUS at 05:21

## 2022-10-11 RX ADMIN — Medication 1 MILLIGRAM(S): at 12:09

## 2022-10-11 NOTE — PROGRESS NOTE ADULT - ASSESSMENT
62-year-old male with past medical hx of diabetes hypertension hyperlipidemia here for elevated blood sugar for the last few days.  Wife states patient also has been more confused but only at night.  Patient also noted to have shaking and tremors throughout the day.  Patient denies any cough fever chills numbness tingling or weakness no trauma or falls.  Of note patient quit drinking wine 1 week ago usually drinks about 3 to 4 glasses of homemade wine.  Patient denies any chest pain shortness of breath nausea vomiting.  Patient states had diarrhea few days ago now resolved.    1 PNA with metabolic encephalopathy POA   - cw abx  - id fu   - will monitor    2 Uncontrolled dm  - endo fu appreciated   - monitor FS  - ISS    3 HTN  - cw home meds  - dash diet    4 HLD  - cw statin      5 ADRIANNA  - renal fu  - monitor cr       case dw family at length

## 2022-10-11 NOTE — PROGRESS NOTE ADULT - SUBJECTIVE AND OBJECTIVE BOX
[facility] Cardiology Progress Note [phone] (Dr. Quintero, Chanel, Issac, Sabi)    CHIEF COMPLAINT: Patient is a 62y old  Male who presents with a chief complaint of AMS (11 Oct 2022 06:25)      Follow Up Today: The patient denies any chest discomfort or shortness of breath.    HPI:  62-year-old male with past medical hx of diabetes hypertension hyperlipidemia here for elevated blood sugar for the last few days.  Wife states patient also has been more confused but only at night.  Patient also noted to have shaking and tremors throughout the day.  Patient denies any cough fever chills numbness tingling or weakness no trauma or falls.  Of note patient quit drinking wine 1 week ago usually drinks about 3 to 4 glasses of homemade wine.  Patient denies any chest pain shortness of breath nausea vomiting.  Patient states had diarrhea few days ago now resolved. (08 Oct 2022 19:59)      PAST MEDICAL & SURGICAL HISTORY:  Alcohol use      No significant past surgical history          MEDICATIONS  (STANDING):  amLODIPine   Tablet 5 milliGRAM(s) Oral daily  atorvastatin 20 milliGRAM(s) Oral at bedtime  cefuroxime   Tablet 500 milliGRAM(s) Oral every 12 hours  dextrose 5%. 1000 milliLiter(s) (100 mL/Hr) IV Continuous <Continuous>  dextrose 5%. 1000 milliLiter(s) (50 mL/Hr) IV Continuous <Continuous>  dextrose 50% Injectable 25 Gram(s) IV Push once  dextrose 50% Injectable 12.5 Gram(s) IV Push once  dextrose 50% Injectable 25 Gram(s) IV Push once  folic acid 1 milliGRAM(s) Oral daily  glucagon  Injectable 1 milliGRAM(s) IntraMuscular once  heparin   Injectable 5000 Unit(s) SubCutaneous every 8 hours  insulin glargine Injectable (LANTUS) 10 Unit(s) SubCutaneous at bedtime  insulin lispro (ADMELOG) corrective regimen sliding scale   SubCutaneous three times a day before meals  insulin lispro (ADMELOG) corrective regimen sliding scale   SubCutaneous at bedtime  insulin lispro (ADMELOG) corrective regimen sliding scale   SubCutaneous three times a day before meals  insulin lispro Injectable (ADMELOG) 3 Unit(s) SubCutaneous three times a day before meals  metoprolol tartrate 25 milliGRAM(s) Oral two times a day  multivitamin 1 Tablet(s) Oral daily  sodium chloride 0.45%. 1000 milliLiter(s) (60 mL/Hr) IV Continuous <Continuous>  sodium chloride 0.9%. 1000 milliLiter(s) (75 mL/Hr) IV Continuous <Continuous>  thiamine 100 milliGRAM(s) Oral daily    MEDICATIONS  (PRN):  ALBUTerol    90 MICROgram(s) HFA Inhaler 2 Puff(s) Inhalation every 6 hours PRN Shortness of Breath and/or Wheezing  dextrose Oral Gel 15 Gram(s) Oral once PRN Blood Glucose LESS THAN 70 milliGRAM(s)/deciliter  guaiFENesin Oral Liquid (Sugar-Free) 200 milliGRAM(s) Oral every 6 hours PRN Cough      Allergies    No Known Allergies    Intolerances        REVIEW OF SYSTEMS:    All other review of systems is negative unless indicated above    Vital Signs Last 24 Hrs  T(C): 36.9 (11 Oct 2022 05:23), Max: 36.9 (11 Oct 2022 05:23)  T(F): 98.4 (11 Oct 2022 05:23), Max: 98.4 (11 Oct 2022 05:23)  HR: 75 (11 Oct 2022 05:23) (68 - 75)  BP: 144/67 (11 Oct 2022 05:23) (127/70 - 144/67)  BP(mean): --  RR: 18 (11 Oct 2022 05:23) (16 - 18)  SpO2: 92% (11 Oct 2022 05:23) (92% - 97%)    Parameters below as of 11 Oct 2022 05:23  Patient On (Oxygen Delivery Method): room air        I&O's Summary      PHYSICAL EXAM:    Constitutional: NAD, awake and alert, well-developed  Eyes:  EOMI,  Pupils round, No oral cyanosis.  HEENT: No exudate or erythema  Pulmonary: Non-labored, breath sounds are clear bilaterally, No wheezing, rales or rhonchi  Cardiovascular: Regular, S1 and S2, No murmurs, rubs, gallops oir clicks  Gastrointestinal: Bowel Sounds present, soft, nontender.   Ext: No significant LE edema  Neurological: Alert, no gross focal motor deficits  Skin: No rashes.  Psych:  Mood & affect appropriate    LABS: All Labs Reviewed:                        12.1   6.83  )-----------( 282      ( 10 Oct 2022 06:13 )             37.0                         12.1   6.60  )-----------( 282      ( 09 Oct 2022 04:49 )             36.2                         13.5   6.93  )-----------( 263      ( 08 Oct 2022 12:25 )             39.9     10 Oct 2022 06:13    139    |  109    |  49     ----------------------------<  179    4.6     |  16     |  2.50   09 Oct 2022 04:49    137    |  104    |  62     ----------------------------<  181    4.4     |  23     |  3.40   09 Oct 2022 00:36    137    |  104    |  65     ----------------------------<  193    4.4     |  23     |  3.40     Ca    9.2        10 Oct 2022 06:13  Ca    9.0        09 Oct 2022 04:49  Ca    9.2        09 Oct 2022 00:36  Phos  3.0       10 Oct 2022 06:13  Phos  3.0       09 Oct 2022 00:36  Phos  3.1       08 Oct 2022 22:19  Mg     2.1       10 Oct 2022 06:13  Mg     2.3       09 Oct 2022 00:36  Mg     2.2       08 Oct 2022 22:19    TPro  6.4    /  Alb  2.1    /  TBili  0.7    /  DBili  x      /  AST  120    /  ALT  134    /  AlkPhos  82     10 Oct 2022 06:13  TPro  6.6    /  Alb  2.3    /  TBili  0.7    /  DBili  x      /  AST  72     /  ALT  89     /  AlkPhos  71     09 Oct 2022 04:49  TPro  6.5    /  Alb  2.3    /  TBili  0.6    /  DBili  0.2    /  AST  65     /  ALT  81     /  AlkPhos  68     08 Oct 2022 22:19          Blood Culture: Organism --  Gram Stain Blood -- Gram Stain --  Specimen Source Clean Catch Clean Catch (Midstream)  Culture-Blood --    Organism --  Gram Stain Blood -- Gram Stain --  Specimen Source .Blood Blood  Culture-Blood --    Organism --  Gram Stain Blood -- Gram Stain --  Specimen Source .Blood Blood  Culture-Blood --            RADIOLOGY/EKG:    Attending Attestation:   20 minutes spent on total encounter; more than 50% of the visit was spent counseling and/or coordinating care by the attending physician.     ASSESSMENT AND PLAN [facility] Cardiology Progress Note [phone] (Dr. Quintero, Chanel, Issac, Sabi)    CHIEF COMPLAINT: Patient is a 62y old  Male who presents with a chief complaint of AMS (11 Oct 2022 06:25)      Follow Up Today: The patient denies any chest discomfort or shortness of breath. He feels that he is improving.    HPI:  62-year-old male with past medical hx of diabetes hypertension hyperlipidemia here for elevated blood sugar for the last few days.  Wife states patient also has been more confused but only at night.  Patient also noted to have shaking and tremors throughout the day.  Patient denies any cough fever chills numbness tingling or weakness no trauma or falls.  Of note patient quit drinking wine 1 week ago usually drinks about 3 to 4 glasses of homemade wine.  Patient denies any chest pain shortness of breath nausea vomiting.  Patient states had diarrhea few days ago now resolved. (08 Oct 2022 19:59)      PAST MEDICAL & SURGICAL HISTORY:  Alcohol use      No significant past surgical history          MEDICATIONS  (STANDING):  amLODIPine   Tablet 5 milliGRAM(s) Oral daily  atorvastatin 20 milliGRAM(s) Oral at bedtime  cefuroxime   Tablet 500 milliGRAM(s) Oral every 12 hours  dextrose 5%. 1000 milliLiter(s) (100 mL/Hr) IV Continuous <Continuous>  dextrose 5%. 1000 milliLiter(s) (50 mL/Hr) IV Continuous <Continuous>  dextrose 50% Injectable 25 Gram(s) IV Push once  dextrose 50% Injectable 12.5 Gram(s) IV Push once  dextrose 50% Injectable 25 Gram(s) IV Push once  folic acid 1 milliGRAM(s) Oral daily  glucagon  Injectable 1 milliGRAM(s) IntraMuscular once  heparin   Injectable 5000 Unit(s) SubCutaneous every 8 hours  insulin glargine Injectable (LANTUS) 10 Unit(s) SubCutaneous at bedtime  insulin lispro (ADMELOG) corrective regimen sliding scale   SubCutaneous three times a day before meals  insulin lispro (ADMELOG) corrective regimen sliding scale   SubCutaneous at bedtime  insulin lispro (ADMELOG) corrective regimen sliding scale   SubCutaneous three times a day before meals  insulin lispro Injectable (ADMELOG) 3 Unit(s) SubCutaneous three times a day before meals  metoprolol tartrate 25 milliGRAM(s) Oral two times a day  multivitamin 1 Tablet(s) Oral daily  sodium chloride 0.45%. 1000 milliLiter(s) (60 mL/Hr) IV Continuous <Continuous>  sodium chloride 0.9%. 1000 milliLiter(s) (75 mL/Hr) IV Continuous <Continuous>  thiamine 100 milliGRAM(s) Oral daily    MEDICATIONS  (PRN):  ALBUTerol    90 MICROgram(s) HFA Inhaler 2 Puff(s) Inhalation every 6 hours PRN Shortness of Breath and/or Wheezing  dextrose Oral Gel 15 Gram(s) Oral once PRN Blood Glucose LESS THAN 70 milliGRAM(s)/deciliter  guaiFENesin Oral Liquid (Sugar-Free) 200 milliGRAM(s) Oral every 6 hours PRN Cough      Allergies    No Known Allergies    Intolerances        REVIEW OF SYSTEMS:    All other review of systems is negative unless indicated above    Vital Signs Last 24 Hrs  T(C): 36.9 (11 Oct 2022 05:23), Max: 36.9 (11 Oct 2022 05:23)  T(F): 98.4 (11 Oct 2022 05:23), Max: 98.4 (11 Oct 2022 05:23)  HR: 75 (11 Oct 2022 05:23) (68 - 75)  BP: 144/67 (11 Oct 2022 05:23) (127/70 - 144/67)  BP(mean): --  RR: 18 (11 Oct 2022 05:23) (16 - 18)  SpO2: 92% (11 Oct 2022 05:23) (92% - 97%)    Parameters below as of 11 Oct 2022 05:23  Patient On (Oxygen Delivery Method): room air        I&O's Summary      PHYSICAL EXAM:    Constitutional: NAD, awake and alert, well-developed  Eyes:  EOMI,  Pupils round, No oral cyanosis.  HEENT: No exudate or erythema  Pulmonary: Non-labored, breath sounds are clear bilaterally, No wheezing, rales or rhonchi  Cardiovascular: Regular, S1 and S2, No murmurs, rubs, gallops oir clicks  Gastrointestinal: Bowel Sounds present, soft, nontender.   Ext: No significant LE edema  Neurological: Alert, no gross focal motor deficits  Skin: No rashes.  Psych:  Mood & affect appropriate    LABS: All Labs Reviewed:                        12.1   6.83  )-----------( 282      ( 10 Oct 2022 06:13 )             37.0                         12.1   6.60  )-----------( 282      ( 09 Oct 2022 04:49 )             36.2                         13.5   6.93  )-----------( 263      ( 08 Oct 2022 12:25 )             39.9     10 Oct 2022 06:13    139    |  109    |  49     ----------------------------<  179    4.6     |  16     |  2.50   09 Oct 2022 04:49    137    |  104    |  62     ----------------------------<  181    4.4     |  23     |  3.40   09 Oct 2022 00:36    137    |  104    |  65     ----------------------------<  193    4.4     |  23     |  3.40     Ca    9.2        10 Oct 2022 06:13  Ca    9.0        09 Oct 2022 04:49  Ca    9.2        09 Oct 2022 00:36  Phos  3.0       10 Oct 2022 06:13  Phos  3.0       09 Oct 2022 00:36  Phos  3.1       08 Oct 2022 22:19  Mg     2.1       10 Oct 2022 06:13  Mg     2.3       09 Oct 2022 00:36  Mg     2.2       08 Oct 2022 22:19    TPro  6.4    /  Alb  2.1    /  TBili  0.7    /  DBili  x      /  AST  120    /  ALT  134    /  AlkPhos  82     10 Oct 2022 06:13  TPro  6.6    /  Alb  2.3    /  TBili  0.7    /  DBili  x      /  AST  72     /  ALT  89     /  AlkPhos  71     09 Oct 2022 04:49  TPro  6.5    /  Alb  2.3    /  TBili  0.6    /  DBili  0.2    /  AST  65     /  ALT  81     /  AlkPhos  68     08 Oct 2022 22:19          Blood Culture: Organism --  Gram Stain Blood -- Gram Stain --  Specimen Source Clean Catch Clean Catch (Midstream)  Culture-Blood --    Organism --  Gram Stain Blood -- Gram Stain --  Specimen Source .Blood Blood  Culture-Blood --    Organism --  Gram Stain Blood -- Gram Stain --  Specimen Source .Blood Blood  Culture-Blood --            RADIOLOGY/EKG:    Attending Attestation:   20 minutes spent on total encounter; more than 50% of the visit was spent counseling and/or coordinating care by the attending physician.     ASSESSMENT AND PLAN

## 2022-10-11 NOTE — PROGRESS NOTE ADULT - SUBJECTIVE AND OBJECTIVE BOX
Neurology Follow up note    KARMA [id number]    HPI:  62-year-old male with past medical hx of diabetes hypertension hyperlipidemia here for elevated blood sugar for the last few days.  Wife states patient also has been more confused but only at night.  Patient also noted to have shaking and tremors throughout the day.  Patient denies any cough fever chills numbness tingling or weakness no trauma or falls.  Of note patient quit drinking wine 1 week ago usually drinks about 3 to 4 glasses of homemade wine.  Patient denies any chest pain shortness of breath nausea vomiting.  Patient states had diarrhea few days ago now resolved. (08 Oct 2022 19:59)      Interval History -no new complaints    Patient is seen, chart was reviewed and case was discussed with the treatment team.  Pt is not in any distress.   Lying on bed comfortably.       Vital Signs Last 24 Hrs  T(C): 36.3 (11 Oct 2022 11:23), Max: 36.9 (11 Oct 2022 05:23)  T(F): 97.4 (11 Oct 2022 11:23), Max: 98.4 (11 Oct 2022 05:23)  HR: 79 (11 Oct 2022 17:25) (68 - 82)  BP: 156/80 (11 Oct 2022 17:25) (142/75 - 156/80)  BP(mean): --  RR: 19 (11 Oct 2022 11:23) (18 - 19)  SpO2: 94% (11 Oct 2022 11:23) (92% - 95%)    Parameters below as of 11 Oct 2022 11:23  Patient On (Oxygen Delivery Method): room air            REVIEW OF SYSTEMS:    Constitutional: No fever, weight loss or fatigue  Eyes: No eye pain, visual disturbances, or discharge  ENT:  No difficulty hearing, tinnitus, vertigo; No sinus or throat pain  Neck: No pain or stiffness  Respiratory: No cough, wheezing, chills or hemoptysis  Cardiovascular: No chest pain, palpitations, shortness of breath, dizziness or leg swelling  Gastrointestinal: No abdominal or epigastric pain. No nausea, vomiting or hematemesis;  Genitourinary: No dysuria, frequency, hematuria or incontinence  Neurological: No headaches, memory loss, loss of strength, numbness or tremors  Psychiatric: No depression, anxiety, mood swings or difficulty sleeping  Musculoskeletal: No joint pain or swelling; No muscle, back or extremity pain  Skin: No itching, burning, rashes or lesions   Lymph Nodes: No enlarged glands  Endocrine: No heat or cold intolerance; No hair loss,  Allergy and Immunologic: No hives or eczema    On Neurological Examination:    Mental Status - Pt is alert, awake, oriented X3.  Follows commands well and able to answer questions appropriately.Mood and affect  normal    Speech -  Normal.    Cranial Nerves - Pupils 3 mm equal and reactive to light, extraocular eye movements intact. Pt has no visual field deficit.  Pt has no  facial asymmetry. Facial sensation is intact.Tongue - is in midline.    Muscle tone - is kaushik      Motor Exam - 5/5 all over, No drift. No shaking or tremors.    Sensory Exam -  Pt withdraws all extremities equally on stimulation. No asymmetry seen. No complaints of tingling, numbness.    coordination:    Finger to nose: normal    Deep tendon Reflexes - 2 plus all over.    Neck Supple -  Yes.     MEDICATIONS    ALBUTerol    90 MICROgram(s) HFA Inhaler 2 Puff(s) Inhalation every 6 hours PRN  amLODIPine   Tablet 5 milliGRAM(s) Oral daily  atorvastatin 20 milliGRAM(s) Oral at bedtime  dextrose 5%. 1000 milliLiter(s) IV Continuous <Continuous>  dextrose 5%. 1000 milliLiter(s) IV Continuous <Continuous>  dextrose 50% Injectable 25 Gram(s) IV Push once  dextrose 50% Injectable 12.5 Gram(s) IV Push once  dextrose 50% Injectable 25 Gram(s) IV Push once  dextrose Oral Gel 15 Gram(s) Oral once PRN  folic acid 1 milliGRAM(s) Oral daily  glucagon  Injectable 1 milliGRAM(s) IntraMuscular once  guaiFENesin Oral Liquid (Sugar-Free) 200 milliGRAM(s) Oral every 6 hours PRN  heparin   Injectable 5000 Unit(s) SubCutaneous every 8 hours  insulin glargine Injectable (LANTUS) 10 Unit(s) SubCutaneous at bedtime  insulin lispro (ADMELOG) corrective regimen sliding scale   SubCutaneous three times a day before meals  insulin lispro (ADMELOG) corrective regimen sliding scale   SubCutaneous at bedtime  insulin lispro (ADMELOG) corrective regimen sliding scale   SubCutaneous three times a day before meals  insulin lispro Injectable (ADMELOG) 3 Unit(s) SubCutaneous three times a day before meals  metoprolol tartrate 25 milliGRAM(s) Oral two times a day  multivitamin 1 Tablet(s) Oral daily  piperacillin/tazobactam IVPB.. 3.375 Gram(s) IV Intermittent every 12 hours  sodium chloride 0.45%. 1000 milliLiter(s) IV Continuous <Continuous>  sodium chloride 0.9%. 1000 milliLiter(s) IV Continuous <Continuous>  thiamine 100 milliGRAM(s) Oral daily      Allergies    No Known Allergies    Intolerances      10-11    141  |  111<H>  |  37<H>  ----------------------------<  150<H>  3.9   |  20<L>  |  2.00<H>    Ca    9.5      11 Oct 2022 07:52  Phos  3.0     10-10  Mg     2.1     10-10    TPro  6.4  /  Alb  2.1<L>  /  TBili  0.7  /  DBili  x   /  AST  120<H>  /  ALT  134<H>  /  AlkPhos  82  10-10      Hemoglobin A1C:     Vitamin B12     RADIOLOGY    ASSESSMENT AND PLAN:      seen for ams /tremor  likely metabolic encephalopathy  doubt cva    no further neuro w/u  close monitoring of glucose /bun/creat  Physical therapy evaluation.  OOB to chair/ambulation with assistance only  Pain is accessed and addressed.  Would continue to follow.

## 2022-10-11 NOTE — PROGRESS NOTE ADULT - SUBJECTIVE AND OBJECTIVE BOX
Patient is a 62y old  Male who presents with a chief complaint of AMS (11 Oct 2022 10:57)    Date of servie : 10-11-22 @ 14:21  INTERVAL HPI/OVERNIGHT EVENTS:  T(C): 36.3 (10-11-22 @ 11:23), Max: 36.9 (10-11-22 @ 05:23)  HR: 82 (10-11-22 @ 11:23) (68 - 82)  BP: 142/75 (10-11-22 @ 11:23) (139/76 - 144/67)  RR: 19 (10-11-22 @ 11:23) (18 - 19)  SpO2: 94% (10-11-22 @ 11:23) (92% - 97%)  Wt(kg): --  I&O's Summary    10 Oct 2022 07:01  -  11 Oct 2022 07:00  --------------------------------------------------------  IN: 660 mL / OUT: 0 mL / NET: 660 mL    11 Oct 2022 07:01  -  11 Oct 2022 14:21  --------------------------------------------------------  IN: 537 mL / OUT: 275 mL / NET: 262 mL        LABS:                        12.2   6.66  )-----------( 477      ( 11 Oct 2022 07:52 )             37.1     10-11    141  |  111<H>  |  37<H>  ----------------------------<  150<H>  3.9   |  20<L>  |  2.00<H>    Ca    9.5      11 Oct 2022 07:52  Phos  3.0     10-10  Mg     2.1     10-10    TPro  6.4  /  Alb  2.1<L>  /  TBili  0.7  /  DBili  x   /  AST  120<H>  /  ALT  134<H>  /  AlkPhos  82  10-10        CAPILLARY BLOOD GLUCOSE      POCT Blood Glucose.: 234 mg/dL (11 Oct 2022 12:15)  POCT Blood Glucose.: 147 mg/dL (11 Oct 2022 08:04)  POCT Blood Glucose.: 145 mg/dL (10 Oct 2022 22:03)  POCT Blood Glucose.: 172 mg/dL (10 Oct 2022 17:48)            MEDICATIONS  (STANDING):  amLODIPine   Tablet 5 milliGRAM(s) Oral daily  atorvastatin 20 milliGRAM(s) Oral at bedtime  cefuroxime   Tablet 500 milliGRAM(s) Oral every 12 hours  dextrose 5%. 1000 milliLiter(s) (100 mL/Hr) IV Continuous <Continuous>  dextrose 5%. 1000 milliLiter(s) (50 mL/Hr) IV Continuous <Continuous>  dextrose 50% Injectable 25 Gram(s) IV Push once  dextrose 50% Injectable 12.5 Gram(s) IV Push once  dextrose 50% Injectable 25 Gram(s) IV Push once  folic acid 1 milliGRAM(s) Oral daily  glucagon  Injectable 1 milliGRAM(s) IntraMuscular once  heparin   Injectable 5000 Unit(s) SubCutaneous every 8 hours  insulin glargine Injectable (LANTUS) 10 Unit(s) SubCutaneous at bedtime  insulin lispro (ADMELOG) corrective regimen sliding scale   SubCutaneous at bedtime  insulin lispro Injectable (ADMELOG) 3 Unit(s) SubCutaneous three times a day before meals  metoprolol tartrate 25 milliGRAM(s) Oral two times a day  multivitamin 1 Tablet(s) Oral daily  sodium chloride 0.45%. 1000 milliLiter(s) (60 mL/Hr) IV Continuous <Continuous>    MEDICATIONS  (PRN):  ALBUTerol    90 MICROgram(s) HFA Inhaler 2 Puff(s) Inhalation every 6 hours PRN Shortness of Breath and/or Wheezing  dextrose Oral Gel 15 Gram(s) Oral once PRN Blood Glucose LESS THAN 70 milliGRAM(s)/deciliter  guaiFENesin Oral Liquid (Sugar-Free) 200 milliGRAM(s) Oral every 6 hours PRN Cough          PHYSICAL EXAM:  GENERAL: NAD, well-groomed, well-developed  HEAD:  Atraumatic, Normocephalic  CHEST/LUNG: Clear to percussion bilaterally; No rales, rhonchi, wheezing, or rubs  HEART: Regular rate and rhythm; No murmurs, rubs, or gallops  ABDOMEN: Soft, Nontender, Nondistended; Bowel sounds present  EXTREMITIES:  2+ Peripheral Pulses, No clubbing, cyanosis, or edema  LYMPH: No lymphadenopathy noted  SKIN: No rashes or lesions    Care Discussed with Consultants/Other Providers [ ] YES  [ ] NO

## 2022-10-11 NOTE — PROGRESS NOTE ADULT - SUBJECTIVE AND OBJECTIVE BOX
[facility]FECTIOUS DISEASE  Daron Bañuelos MD PhD, Carmen Tamayo MD, Nereyda Greenwood MD, Chase Manrique MD, Nikolas Wood MD  and providing coverage with Jyoti Nelson MD  Providing Infectious Disease Consultations at [facility], [facility], [facility]'s    Office# [phone] to schedule follow up appointments  Answering Service for urgent calls or New Consults [phone]  Cell# to text for urgent issues Daron Bañuelos [phone]     infectious diseases progress note:    KARMA TALAMANTES is a 62y y. o. Male patient    Overnight and events of the last 24hrs reviewed    Allergies    No Known Allergies    Intolerances        ANTIBIOTICS/RELEVANT:  antimicrobials  cefuroxime   Tablet 500 milliGRAM(s) Oral every 12 hours    immunologic:    OTHER:  ALBUTerol    90 MICROgram(s) HFA Inhaler 2 Puff(s) Inhalation every 6 hours PRN  amLODIPine   Tablet 5 milliGRAM(s) Oral daily  atorvastatin 20 milliGRAM(s) Oral at bedtime  dextrose 5%. 1000 milliLiter(s) IV Continuous <Continuous>  dextrose 5%. 1000 milliLiter(s) IV Continuous <Continuous>  dextrose 50% Injectable 25 Gram(s) IV Push once  dextrose 50% Injectable 12.5 Gram(s) IV Push once  dextrose 50% Injectable 25 Gram(s) IV Push once  dextrose Oral Gel 15 Gram(s) Oral once PRN  folic acid 1 milliGRAM(s) Oral daily  glucagon  Injectable 1 milliGRAM(s) IntraMuscular once  guaiFENesin Oral Liquid (Sugar-Free) 200 milliGRAM(s) Oral every 6 hours PRN  heparin   Injectable 5000 Unit(s) SubCutaneous every 8 hours  insulin glargine Injectable (LANTUS) 10 Unit(s) SubCutaneous at bedtime  insulin lispro (ADMELOG) corrective regimen sliding scale   SubCutaneous three times a day before meals  insulin lispro (ADMELOG) corrective regimen sliding scale   SubCutaneous at bedtime  insulin lispro (ADMELOG) corrective regimen sliding scale   SubCutaneous three times a day before meals  insulin lispro Injectable (ADMELOG) 3 Unit(s) SubCutaneous three times a day before meals  metoprolol tartrate 25 milliGRAM(s) Oral two times a day  multivitamin 1 Tablet(s) Oral daily  sodium chloride 0.45%. 1000 milliLiter(s) IV Continuous <Continuous>  sodium chloride 0.9%. 1000 milliLiter(s) IV Continuous <Continuous>  thiamine 100 milliGRAM(s) Oral daily      Objective:  Vital Signs Last 24 Hrs  T(C): 36.9 (11 Oct 2022 05:23), Max: 36.9 (11 Oct 2022 05:23)  T(F): 98.4 (11 Oct 2022 05:23), Max: 98.4 (11 Oct 2022 05:23)  HR: 75 (11 Oct 2022 05:23) (68 - 75)  BP: 144/67 (11 Oct 2022 05:23) (131/81 - 144/67)  BP(mean): --  RR: 18 (11 Oct 2022 05:23) (17 - 18)  SpO2: 92% (11 Oct 2022 05:23) (92% - 97%)    Parameters below as of 11 Oct 2022 05:23  Patient On (Oxygen Delivery Method): room air        T(C): 36.9 (10-11-22 @ 05:23), Max: 36.9 (10-11-22 @ 05:23)  T(C): 36.9 (10-11-22 @ 05:23), Max: 37.2 (10-08-22 @ 12:10)  T(C): 36.9 (10-11-22 @ 05:23), Max: 37.2 (10-08-22 @ 12:10)    PHYSICAL EXAM:  HEENT: NC atraumatic  Neck: supple  Respiratory: no accessory muscle use, breathing comfortably  Cardiovascular: distant  Gastrointestinal: normal appearing, nondistended  Extremities: no clubbing, no cyanosis,        LABS:                          12.2   6.66  )-----------( 477      ( 11 Oct 2022 07:52 )             37.1       WBC  6.66 10-11 @ 07:52  6.83 10-10 @ 06:13  6.60 10-09 @ 04:49  6.93 10-08 @ 12:25      10-11    141  |  111<H>  |  37<H>  ----------------------------<  150<H>  3.9   |  20<L>  |  2.00<H>    Ca    9.5      11 Oct 2022 07:52  Phos  3.0     10-10  Mg     2.1     10-10    TPro  6.4  /  Alb  2.1<L>  /  TBili  0.7  /  DBili  x   /  AST  120<H>  /  ALT  134<H>  /  AlkPhos  82  10-10      Creatinine, Serum: 2.00 mg/dL (10-11-22 @ 07:52)  Creatinine, Serum: 2.50 mg/dL (10-10-22 @ 06:13)  Creatinine, Serum: 3.40 mg/dL (10-09-22 @ 04:49)  Creatinine, Serum: 3.40 mg/dL (10-09-22 @ 00:36)  Creatinine, Serum: 3.40 mg/dL (10-08-22 @ 22:19)  Creatinine, Serum: 3.30 mg/dL (10-08-22 @ 22:19)  Creatinine, Serum: 3.90 mg/dL (10-08-22 @ 12:25)                INFLAMMATORY MARKERS      MICROBIOLOGY:              RADIOLOGY & ADDITIONAL STUDIES:

## 2022-10-11 NOTE — PROGRESS NOTE ADULT - SUBJECTIVE AND OBJECTIVE BOX
Date/Time Patient Seen:  		  Referring MD:   Data Reviewed	       Patient is a 62y old  Male who presents with a chief complaint of AMS (10 Oct 2022 13:35)      Subjective/HPI     PAST MEDICAL & SURGICAL HISTORY:  Alcohol use    No significant past surgical history          Medication list         MEDICATIONS  (STANDING):  amLODIPine   Tablet 5 milliGRAM(s) Oral daily  atorvastatin 20 milliGRAM(s) Oral at bedtime  cefuroxime   Tablet 500 milliGRAM(s) Oral every 12 hours  dextrose 5%. 1000 milliLiter(s) (100 mL/Hr) IV Continuous <Continuous>  dextrose 5%. 1000 milliLiter(s) (50 mL/Hr) IV Continuous <Continuous>  dextrose 50% Injectable 25 Gram(s) IV Push once  dextrose 50% Injectable 12.5 Gram(s) IV Push once  dextrose 50% Injectable 25 Gram(s) IV Push once  folic acid 1 milliGRAM(s) Oral daily  glucagon  Injectable 1 milliGRAM(s) IntraMuscular once  heparin   Injectable 5000 Unit(s) SubCutaneous every 8 hours  insulin glargine Injectable (LANTUS) 10 Unit(s) SubCutaneous at bedtime  insulin lispro (ADMELOG) corrective regimen sliding scale   SubCutaneous three times a day before meals  insulin lispro (ADMELOG) corrective regimen sliding scale   SubCutaneous at bedtime  insulin lispro (ADMELOG) corrective regimen sliding scale   SubCutaneous three times a day before meals  insulin lispro Injectable (ADMELOG) 3 Unit(s) SubCutaneous three times a day before meals  metoprolol tartrate 25 milliGRAM(s) Oral two times a day  multivitamin 1 Tablet(s) Oral daily  sodium chloride 0.45%. 1000 milliLiter(s) (60 mL/Hr) IV Continuous <Continuous>  sodium chloride 0.9%. 1000 milliLiter(s) (75 mL/Hr) IV Continuous <Continuous>  thiamine 100 milliGRAM(s) Oral daily    MEDICATIONS  (PRN):  ALBUTerol    90 MICROgram(s) HFA Inhaler 2 Puff(s) Inhalation every 6 hours PRN Shortness of Breath and/or Wheezing  dextrose Oral Gel 15 Gram(s) Oral once PRN Blood Glucose LESS THAN 70 milliGRAM(s)/deciliter  guaiFENesin Oral Liquid (Sugar-Free) 200 milliGRAM(s) Oral every 6 hours PRN Cough         Vitals log        ICU Vital Signs Last 24 Hrs  T(C): 36.9 (11 Oct 2022 05:23), Max: 36.9 (11 Oct 2022 05:23)  T(F): 98.4 (11 Oct 2022 05:23), Max: 98.4 (11 Oct 2022 05:23)  HR: 75 (11 Oct 2022 05:23) (68 - 75)  BP: 144/67 (11 Oct 2022 05:23) (127/70 - 144/67)  BP(mean): --  ABP: --  ABP(mean): --  RR: 18 (11 Oct 2022 05:23) (16 - 18)  SpO2: 92% (11 Oct 2022 05:23) (92% - 97%)    O2 Parameters below as of 11 Oct 2022 05:23  Patient On (Oxygen Delivery Method): room air                 Input and Output:  I&O's Detail      Lab Data                        12.1   6.83  )-----------( 282      ( 10 Oct 2022 06:13 )             37.0     10-10    139  |  109<H>  |  49<H>  ----------------------------<  179<H>  4.6   |  16<L>  |  2.50<H>    Ca    9.2      10 Oct 2022 06:13  Phos  3.0     10-10  Mg     2.1     10-10    TPro  6.4  /  Alb  2.1<L>  /  TBili  0.7  /  DBili  x   /  AST  120<H>  /  ALT  134<H>  /  AlkPhos  82  10-10            Review of Systems	      Objective     Physical Examination    heart s1s2  lung dec BS  head nc      Pertinent Lab findings & Imaging      Essence:  NO   Adequate UO     I&O's Detail           Discussed with:     Cultures:	        Radiology

## 2022-10-11 NOTE — PROGRESS NOTE ADULT - SUBJECTIVE AND OBJECTIVE BOX
Patient is a 62y old  Male who presents with a chief complaint of AMS (10 Oct 2022 09:16)    Patient seen in follow up for ADRIANNA on CKD.        PAST MEDICAL HISTORY:  Alcohol use      MEDICATIONS  (STANDING):  amLODIPine   Tablet 5 milliGRAM(s) Oral daily  atorvastatin 20 milliGRAM(s) Oral at bedtime  cefuroxime   Tablet 500 milliGRAM(s) Oral every 12 hours  dextrose 5%. 1000 milliLiter(s) (100 mL/Hr) IV Continuous <Continuous>  dextrose 5%. 1000 milliLiter(s) (50 mL/Hr) IV Continuous <Continuous>  dextrose 50% Injectable 25 Gram(s) IV Push once  dextrose 50% Injectable 12.5 Gram(s) IV Push once  dextrose 50% Injectable 25 Gram(s) IV Push once  folic acid 1 milliGRAM(s) Oral daily  glucagon  Injectable 1 milliGRAM(s) IntraMuscular once  heparin   Injectable 5000 Unit(s) SubCutaneous every 8 hours  insulin glargine Injectable (LANTUS) 10 Unit(s) SubCutaneous at bedtime  insulin lispro (ADMELOG) corrective regimen sliding scale   SubCutaneous at bedtime  insulin lispro Injectable (ADMELOG) 3 Unit(s) SubCutaneous three times a day before meals  metoprolol tartrate 25 milliGRAM(s) Oral two times a day  multivitamin 1 Tablet(s) Oral daily  sodium chloride 0.45%. 1000 milliLiter(s) (60 mL/Hr) IV Continuous <Continuous>    MEDICATIONS  (PRN):  ALBUTerol    90 MICROgram(s) HFA Inhaler 2 Puff(s) Inhalation every 6 hours PRN Shortness of Breath and/or Wheezing  dextrose Oral Gel 15 Gram(s) Oral once PRN Blood Glucose LESS THAN 70 milliGRAM(s)/deciliter  guaiFENesin Oral Liquid (Sugar-Free) 200 milliGRAM(s) Oral every 6 hours PRN Cough    T(C): 36.3 (10-11-22 @ 11:23), Max: 36.9 (10-11-22 @ 05:23)  HR: 82 (10-11-22 @ 11:23) (68 - 83)  BP: 142/75 (10-11-22 @ 11:23) (127/70 - 144/81)  RR: 19 (10-11-22 @ 11:23)  SpO2: 94% (10-11-22 @ 11:23)  Wt(kg): --  I&O's Detail    10 Oct 2022 07:01  -  11 Oct 2022 07:00  --------------------------------------------------------  IN:    sodium chloride 0.45%: 660 mL  Total IN: 660 mL    OUT:  Total OUT: 0 mL    Total NET: 660 mL      11 Oct 2022 07:01  -  11 Oct 2022 14:27  --------------------------------------------------------  IN:    Oral Fluid: 237 mL    sodium chloride 0.45%: 300 mL  Total IN: 537 mL    OUT:    Voided (mL): 275 mL  Total OUT: 275 mL    Total NET: 262 mL          PHYSICAL EXAM:  General: No distress  Respiratory: b/l air entry  Cardiovascular: S1 S2  Gastrointestinal: soft  Extremities:  no edema          LABORATORY:                        12.2   6.66  )-----------( 477      ( 11 Oct 2022 07:52 )             37.1     10-11    141  |  111<H>  |  37<H>  ----------------------------<  150<H>  3.9   |  20<L>  |  2.00<H>    Ca    9.5      11 Oct 2022 07:52  Phos  3.0     10-10  Mg     2.1     10-10    TPro  6.4  /  Alb  2.1<L>  /  TBili  0.7  /  DBili  x   /  AST  120<H>  /  ALT  134<H>  /  AlkPhos  82  10-10    Sodium, Serum: 141 mmol/L (10-11 @ 07:52)  Sodium, Serum: 139 mmol/L (10-10 @ 06:13)    Potassium, Serum: 3.9 mmol/L (10-11 @ 07:52)  Potassium, Serum: 4.6 mmol/L (10-10 @ 06:13)    Hemoglobin: 12.2 g/dL (10-11 @ 07:52)  Hemoglobin: 12.1 g/dL (10-10 @ 06:13)  Hemoglobin: 12.1 g/dL (10-09 @ 04:49)    Creatinine, Serum 2.00 (10-11 @ 07:52)  Creatinine, Serum 2.50 (10-10 @ 06:13)  Creatinine, Serum 3.40 (10-09 @ 04:49)  Creatinine, Serum 3.40 (10-09 @ 00:36)        LIVER FUNCTIONS - ( 10 Oct 2022 06:13 )  Alb: 2.1 g/dL / Pro: 6.4 g/dL / ALK PHOS: 82 U/L / ALT: 134 U/L / AST: 120 U/L / GGT: x

## 2022-10-11 NOTE — PROGRESS NOTE ADULT - ASSESSMENT
62-year-old male with past medical hx of diabetes hypertension hyperlipidemia    ams  hld  htn  pna  acute infection  DM  GGO on CT - Pulm nodules on CT  Smoker  wine drinker - ROSE - Etoh use disorder    on PO ABX  on IVF  vs noted  inhaler -       ct reviewed with pt and wife  tx for poss PNA - Lower resp tract infection  ID eval  cx - biomarkers  pt will need follow up CT imaging in 6 - 8 weeks - eval resolution of GGO and Pulm Nodules and Meds LN - in a smoker  pt will need PFT as outpatient - and Pulm follow up  smoking cess ed  bronchodilators  cough rx regimen  ROSE - education - counseling - emotional support  replete lytes  dvt p  monitor VS and Sat

## 2022-10-11 NOTE — PROGRESS NOTE ADULT - SUBJECTIVE AND OBJECTIVE BOX
CAPILLARY BLOOD GLUCOSE      POCT Blood Glucose.: 147 mg/dL (11 Oct 2022 08:04)  POCT Blood Glucose.: 145 mg/dL (10 Oct 2022 22:03)  POCT Blood Glucose.: 172 mg/dL (10 Oct 2022 17:48)  POCT Blood Glucose.: 227 mg/dL (10 Oct 2022 11:51)      Vital Signs Last 24 Hrs  T(C): 36.9 (11 Oct 2022 05:23), Max: 36.9 (11 Oct 2022 05:23)  T(F): 98.4 (11 Oct 2022 05:23), Max: 98.4 (11 Oct 2022 05:23)  HR: 75 (11 Oct 2022 05:23) (68 - 75)  BP: 144/67 (11 Oct 2022 05:23) (131/81 - 144/67)  BP(mean): --  RR: 18 (11 Oct 2022 05:23) (16 - 18)  SpO2: 92% (11 Oct 2022 05:23) (92% - 97%)    Parameters below as of 11 Oct 2022 05:23  Patient On (Oxygen Delivery Method): room air        General: WN/WD NAD  Respiratory: CTA B/L  CV: RRR, S1S2, no murmurs, rubs or gallops  Abdominal: Soft, NT, ND +BS, Last BM  Extremities: No edema, + peripheral pulses     10-10    139  |  109<H>  |  49<H>  ----------------------------<  179<H>  4.6   |  16<L>  |  2.50<H>    Ca    9.2      10 Oct 2022 06:13  Phos  3.0     10-10  Mg     2.1     10-10    TPro  6.4  /  Alb  2.1<L>  /  TBili  0.7  /  DBili  x   /  AST  120<H>  /  ALT  134<H>  /  AlkPhos  82  10-10      atorvastatin 20 milliGRAM(s) Oral at bedtime  dextrose 50% Injectable 25 Gram(s) IV Push once  dextrose 50% Injectable 12.5 Gram(s) IV Push once  dextrose 50% Injectable 25 Gram(s) IV Push once  dextrose Oral Gel 15 Gram(s) Oral once PRN  glucagon  Injectable 1 milliGRAM(s) IntraMuscular once  insulin glargine Injectable (LANTUS) 10 Unit(s) SubCutaneous at bedtime  insulin lispro (ADMELOG) corrective regimen sliding scale   SubCutaneous three times a day before meals  insulin lispro (ADMELOG) corrective regimen sliding scale   SubCutaneous at bedtime  insulin lispro (ADMELOG) corrective regimen sliding scale   SubCutaneous three times a day before meals  insulin lispro Injectable (ADMELOG) 3 Unit(s) SubCutaneous three times a day before meals

## 2022-10-11 NOTE — PROGRESS NOTE ADULT - ASSESSMENT
62-year-old male with past medical hx of diabetes hypertension hyperlipidemia here for elevated blood sugar for the last few days.  Wife states patient also has been more confused but only at night.  Patient also noted to have shaking and tremors throughout the day.  Patient has been coughing now for several days and the wife reports she just thought he had a cold.  had diarrhea few days ago now resolved.     RECOMMENDATIONS  Cough, abn lung exam and imaging, concerning for PNA and prodrome with diarrhea suggesting this may be post viral, legionella, or typical bacterial process. Hyperglycemia may be due to acute infection.   -Zosyn started 10/8-early am  10/10-changed to Ceftriaxone   10/11 changed to cefuroxime 500mg PO BID with last day 10/12    can look at dispo planning    Thank you for consulting us and involving us in the management of this most interesting and challenging case.  We will follow along in the care of this patient. Please call us at [phone] or text me directly on my cell# at [phone] with any concerns.

## 2022-10-11 NOTE — PROGRESS NOTE ADULT - ASSESSMENT
ADRIANNA on CKD 3: Prerenal azotemia, R/o retention  Pneumonia  Hypertension  Diabetes    Improving renal indices. To continue current meds. Monitor blood sugar levels. Insulin coverage as needed.   Monitor BP trend. Titrate BP meds as needed. Salt restriction. Avoid nephrotoxic meds as possible. Will follow electrolytes and renal function trend.

## 2022-10-11 NOTE — PROGRESS NOTE ADULT - ASSESSMENT
62M with DM2, HTN, HLD, CKD a/w shaking/sob found with NING PNA    ams  hld  htn  pna  acute infection  DM    Suggest:    1. NING PNA  - C/w IV antibiotics - Zosyn IV    2. HTN  - Continue with amlodipine 5mg daily  - Continue with metoprolol 25mg twice daily.    3.Hyperlipidemia  - C/w Lipitor     4. DVT prophylaxis with Heparin SQ 62M with DM2, HTN, HLD, CKD a/w shaking/sob found with NING PNA and acute on chronic renal insufficiency    ams  hld  htn  pna  acute infection  DM    Suggest:    1. NING PNA  - C/w IV antibiotics - Zosyn IV    2. HTN  - Continue with amlodipine 5mg daily  - Continue with metoprolol 25mg twice daily.    3.Hyperlipidemia  - C/w Lipitor     4. DVT prophylaxis with Heparin SQ

## 2022-10-12 LAB
ANION GAP SERPL CALC-SCNC: 9 MMOL/L — SIGNIFICANT CHANGE UP (ref 5–17)
BUN SERPL-MCNC: 28 MG/DL — HIGH (ref 7–23)
CALCIUM SERPL-MCNC: 9.9 MG/DL — SIGNIFICANT CHANGE UP (ref 8.5–10.1)
CHLORIDE SERPL-SCNC: 111 MMOL/L — HIGH (ref 96–108)
CO2 SERPL-SCNC: 20 MMOL/L — LOW (ref 22–31)
CREAT SERPL-MCNC: 1.9 MG/DL — HIGH (ref 0.5–1.3)
CULTURE RESULTS: SIGNIFICANT CHANGE UP
EGFR: 40 ML/MIN/1.73M2 — LOW
GLUCOSE SERPL-MCNC: 236 MG/DL — HIGH (ref 70–99)
HCT VFR BLD CALC: 37.5 % — LOW (ref 39–50)
HGB BLD-MCNC: 12.2 G/DL — LOW (ref 13–17)
MCHC RBC-ENTMCNC: 29.7 PG — SIGNIFICANT CHANGE UP (ref 27–34)
MCHC RBC-ENTMCNC: 32.5 GM/DL — SIGNIFICANT CHANGE UP (ref 32–36)
MCV RBC AUTO: 91.2 FL — SIGNIFICANT CHANGE UP (ref 80–100)
NRBC # BLD: 0 /100 WBCS — SIGNIFICANT CHANGE UP (ref 0–0)
PLATELET # BLD AUTO: 536 K/UL — HIGH (ref 150–400)
POTASSIUM SERPL-MCNC: 4 MMOL/L — SIGNIFICANT CHANGE UP (ref 3.5–5.3)
POTASSIUM SERPL-SCNC: 4 MMOL/L — SIGNIFICANT CHANGE UP (ref 3.5–5.3)
RBC # BLD: 4.11 M/UL — LOW (ref 4.2–5.8)
RBC # FLD: 13.9 % — SIGNIFICANT CHANGE UP (ref 10.3–14.5)
SODIUM SERPL-SCNC: 140 MMOL/L — SIGNIFICANT CHANGE UP (ref 135–145)
SPECIMEN SOURCE: SIGNIFICANT CHANGE UP
WBC # BLD: 6.61 K/UL — SIGNIFICANT CHANGE UP (ref 3.8–10.5)
WBC # FLD AUTO: 6.61 K/UL — SIGNIFICANT CHANGE UP (ref 3.8–10.5)

## 2022-10-12 RX ADMIN — HEPARIN SODIUM 5000 UNIT(S): 5000 INJECTION INTRAVENOUS; SUBCUTANEOUS at 05:15

## 2022-10-12 RX ADMIN — Medication 25 MILLIGRAM(S): at 17:27

## 2022-10-12 RX ADMIN — Medication 25 MILLIGRAM(S): at 05:15

## 2022-10-12 RX ADMIN — Medication 4: at 17:27

## 2022-10-12 RX ADMIN — Medication 2: at 08:43

## 2022-10-12 RX ADMIN — AMLODIPINE BESYLATE 5 MILLIGRAM(S): 2.5 TABLET ORAL at 05:15

## 2022-10-12 RX ADMIN — HEPARIN SODIUM 5000 UNIT(S): 5000 INJECTION INTRAVENOUS; SUBCUTANEOUS at 21:49

## 2022-10-12 RX ADMIN — Medication 500 MILLIGRAM(S): at 17:27

## 2022-10-12 RX ADMIN — SODIUM CHLORIDE 60 MILLILITER(S): 9 INJECTION, SOLUTION INTRAVENOUS at 08:44

## 2022-10-12 RX ADMIN — Medication 3 UNIT(S): at 08:43

## 2022-10-12 RX ADMIN — Medication 500 MILLIGRAM(S): at 05:15

## 2022-10-12 RX ADMIN — INSULIN GLARGINE 10 UNIT(S): 100 INJECTION, SOLUTION SUBCUTANEOUS at 21:59

## 2022-10-12 RX ADMIN — ATORVASTATIN CALCIUM 20 MILLIGRAM(S): 80 TABLET, FILM COATED ORAL at 21:50

## 2022-10-12 RX ADMIN — Medication 3 UNIT(S): at 13:07

## 2022-10-12 RX ADMIN — SODIUM CHLORIDE 60 MILLILITER(S): 9 INJECTION, SOLUTION INTRAVENOUS at 19:03

## 2022-10-12 RX ADMIN — Medication 4: at 13:08

## 2022-10-12 RX ADMIN — Medication 3 UNIT(S): at 17:26

## 2022-10-12 RX ADMIN — Medication 1 MILLIGRAM(S): at 13:08

## 2022-10-12 RX ADMIN — Medication 1 TABLET(S): at 13:08

## 2022-10-12 RX ADMIN — HEPARIN SODIUM 5000 UNIT(S): 5000 INJECTION INTRAVENOUS; SUBCUTANEOUS at 13:07

## 2022-10-12 NOTE — PROGRESS NOTE ADULT - ASSESSMENT
62-year-old male with past medical hx of diabetes hypertension hyperlipidemia here for elevated blood sugar for the last few days.  Wife states patient also has been more confused but only at night.  Patient also noted to have shaking and tremors throughout the day.  Patient has been coughing now for several days and the wife reports she just thought he had a cold.  had diarrhea few days ago now resolved.     RECOMMENDATIONS  Cough, abn lung exam and imaging, concerning for PNA and prodrome with diarrhea suggesting this may be post viral, legionella, or typical bacterial process. Hyperglycemia may be due to acute infection.   -Zosyn started 10/8-early am  10/10-changed to Ceftriaxone   10/11 changed to cefuroxime 500mg PO BID with last day 10/12-today    From an ID standpoint no further requirement for inpatient status for the management of ID issues. Fine with discharge from ID standpoint when other medical issues no longer require inpatient care and social issues allow for a safe discharge plan. To schedule an outpatient ID follow up appointment please call our office at [phone]    Thank you for consulting us and involving us in the management of this most interesting and challenging case.  We will follow along in the care of this patient. Please call us at [phone] or text me directly on my cell# at [phone] with any concerns.

## 2022-10-12 NOTE — PROGRESS NOTE ADULT - SUBJECTIVE AND OBJECTIVE BOX
Patient is a 62y old  Male who presents with a chief complaint of AMS (12 Oct 2022 15:09)    Date of servie : 10-12-22 @ 17:11  INTERVAL HPI/OVERNIGHT EVENTS:  T(C): 37.3 (10-12-22 @ 14:09), Max: 37.3 (10-12-22 @ 14:09)  HR: 76 (10-12-22 @ 14:09) (70 - 79)  BP: 152/82 (10-12-22 @ 14:09) (135/81 - 159/70)  RR: 18 (10-12-22 @ 14:09) (18 - 18)  SpO2: 96% (10-12-22 @ 14:09) (92% - 96%)  Wt(kg): --  I&O's Summary    11 Oct 2022 07:01  -  12 Oct 2022 07:00  --------------------------------------------------------  IN: 1677 mL / OUT: 275 mL / NET: 1402 mL    12 Oct 2022 07:01  -  12 Oct 2022 17:11  --------------------------------------------------------  IN: 597 mL / OUT: 1050 mL / NET: -453 mL        LABS:                        12.2   6.61  )-----------( 536      ( 12 Oct 2022 06:43 )             37.5     10-12    140  |  111<H>  |  28<H>  ----------------------------<  236<H>  4.0   |  20<L>  |  1.90<H>    Ca    9.9      12 Oct 2022 13:20          CAPILLARY BLOOD GLUCOSE      POCT Blood Glucose.: 249 mg/dL (12 Oct 2022 12:29)  POCT Blood Glucose.: 165 mg/dL (12 Oct 2022 08:02)  POCT Blood Glucose.: 154 mg/dL (11 Oct 2022 21:26)  POCT Blood Glucose.: 295 mg/dL (11 Oct 2022 18:17)            MEDICATIONS  (STANDING):  amLODIPine   Tablet 5 milliGRAM(s) Oral daily  atorvastatin 20 milliGRAM(s) Oral at bedtime  cefuroxime   Tablet 500 milliGRAM(s) Oral every 12 hours  dextrose 5%. 1000 milliLiter(s) (100 mL/Hr) IV Continuous <Continuous>  dextrose 5%. 1000 milliLiter(s) (50 mL/Hr) IV Continuous <Continuous>  dextrose 50% Injectable 25 Gram(s) IV Push once  dextrose 50% Injectable 12.5 Gram(s) IV Push once  dextrose 50% Injectable 25 Gram(s) IV Push once  folic acid 1 milliGRAM(s) Oral daily  glucagon  Injectable 1 milliGRAM(s) IntraMuscular once  heparin   Injectable 5000 Unit(s) SubCutaneous every 8 hours  insulin glargine Injectable (LANTUS) 10 Unit(s) SubCutaneous at bedtime  insulin lispro (ADMELOG) corrective regimen sliding scale   SubCutaneous at bedtime  insulin lispro (ADMELOG) corrective regimen sliding scale   SubCutaneous three times a day before meals  insulin lispro Injectable (ADMELOG) 3 Unit(s) SubCutaneous three times a day before meals  metoprolol tartrate 25 milliGRAM(s) Oral two times a day  multivitamin 1 Tablet(s) Oral daily  sodium chloride 0.45%. 1000 milliLiter(s) (60 mL/Hr) IV Continuous <Continuous>    MEDICATIONS  (PRN):  ALBUTerol    90 MICROgram(s) HFA Inhaler 2 Puff(s) Inhalation every 6 hours PRN Shortness of Breath and/or Wheezing  dextrose Oral Gel 15 Gram(s) Oral once PRN Blood Glucose LESS THAN 70 milliGRAM(s)/deciliter  guaiFENesin Oral Liquid (Sugar-Free) 200 milliGRAM(s) Oral every 6 hours PRN Cough          PHYSICAL EXAM:  GENERAL: NAD, well-groomed, well-developed  HEAD:  Atraumatic, Normocephalic  CHEST/LUNG: Clear to percussion bilaterally; No rales, rhonchi, wheezing, or rubs  HEART: Regular rate and rhythm; No murmurs, rubs, or gallops  ABDOMEN: Soft, Nontender, Nondistended; Bowel sounds present  EXTREMITIES:  2+ Peripheral Pulses, No clubbing, cyanosis, or edema  LYMPH: No lymphadenopathy noted  SKIN: No rashes or lesions    Care Discussed with Consultants/Other Providers [ ] YES  [ ] NO

## 2022-10-12 NOTE — PROGRESS NOTE ADULT - SUBJECTIVE AND OBJECTIVE BOX
Date/Time Patient Seen:  		  Referring MD:   Data Reviewed	       Patient is a 62y old  Male who presents with a chief complaint of AMS (11 Oct 2022 17:51)      Subjective/HPI     PAST MEDICAL & SURGICAL HISTORY:  Alcohol use    No significant past surgical history          Medication list         MEDICATIONS  (STANDING):  amLODIPine   Tablet 5 milliGRAM(s) Oral daily  atorvastatin 20 milliGRAM(s) Oral at bedtime  cefuroxime   Tablet 500 milliGRAM(s) Oral every 12 hours  dextrose 5%. 1000 milliLiter(s) (100 mL/Hr) IV Continuous <Continuous>  dextrose 5%. 1000 milliLiter(s) (50 mL/Hr) IV Continuous <Continuous>  dextrose 50% Injectable 25 Gram(s) IV Push once  dextrose 50% Injectable 12.5 Gram(s) IV Push once  dextrose 50% Injectable 25 Gram(s) IV Push once  folic acid 1 milliGRAM(s) Oral daily  glucagon  Injectable 1 milliGRAM(s) IntraMuscular once  heparin   Injectable 5000 Unit(s) SubCutaneous every 8 hours  insulin glargine Injectable (LANTUS) 10 Unit(s) SubCutaneous at bedtime  insulin lispro (ADMELOG) corrective regimen sliding scale   SubCutaneous at bedtime  insulin lispro (ADMELOG) corrective regimen sliding scale   SubCutaneous three times a day before meals  insulin lispro Injectable (ADMELOG) 3 Unit(s) SubCutaneous three times a day before meals  metoprolol tartrate 25 milliGRAM(s) Oral two times a day  multivitamin 1 Tablet(s) Oral daily  sodium chloride 0.45%. 1000 milliLiter(s) (60 mL/Hr) IV Continuous <Continuous>    MEDICATIONS  (PRN):  ALBUTerol    90 MICROgram(s) HFA Inhaler 2 Puff(s) Inhalation every 6 hours PRN Shortness of Breath and/or Wheezing  dextrose Oral Gel 15 Gram(s) Oral once PRN Blood Glucose LESS THAN 70 milliGRAM(s)/deciliter  guaiFENesin Oral Liquid (Sugar-Free) 200 milliGRAM(s) Oral every 6 hours PRN Cough         Vitals log        ICU Vital Signs Last 24 Hrs  T(C): 37 (12 Oct 2022 04:58), Max: 37 (12 Oct 2022 04:58)  T(F): 98.6 (12 Oct 2022 04:58), Max: 98.6 (12 Oct 2022 04:58)  HR: 71 (12 Oct 2022 04:58) (70 - 82)  BP: 159/70 (12 Oct 2022 04:58) (135/81 - 159/70)  BP(mean): --  ABP: --  ABP(mean): --  RR: 18 (12 Oct 2022 04:58) (18 - 19)  SpO2: 94% (12 Oct 2022 04:58) (92% - 94%)    O2 Parameters below as of 12 Oct 2022 04:58  Patient On (Oxygen Delivery Method): room air                 Input and Output:  I&O's Detail    10 Oct 2022 07:01  -  11 Oct 2022 07:00  --------------------------------------------------------  IN:    sodium chloride 0.45%: 660 mL  Total IN: 660 mL    OUT:  Total OUT: 0 mL    Total NET: 660 mL      11 Oct 2022 07:01  -  12 Oct 2022 06:16  --------------------------------------------------------  IN:    Oral Fluid: 237 mL    sodium chloride 0.45%: 1200 mL  Total IN: 1437 mL    OUT:    Voided (mL): 275 mL  Total OUT: 275 mL    Total NET: 1162 mL          Lab Data                        12.2   6.66  )-----------( 477      ( 11 Oct 2022 07:52 )             37.1     10-11    141  |  111<H>  |  37<H>  ----------------------------<  150<H>  3.9   |  20<L>  |  2.00<H>    Ca    9.5      11 Oct 2022 07:52              Review of Systems	      Objective     Physical Examination    heart s1s2  lung dec BS  head nc      Pertinent Lab findings & Imaging      Essence:  NO   Adequate UO     I&O's Detail    10 Oct 2022 07:01  -  11 Oct 2022 07:00  --------------------------------------------------------  IN:    sodium chloride 0.45%: 660 mL  Total IN: 660 mL    OUT:  Total OUT: 0 mL    Total NET: 660 mL      11 Oct 2022 07:01  -  12 Oct 2022 06:16  --------------------------------------------------------  IN:    Oral Fluid: 237 mL    sodium chloride 0.45%: 1200 mL  Total IN: 1437 mL    OUT:    Voided (mL): 275 mL  Total OUT: 275 mL    Total NET: 1162 mL               Discussed with:     Cultures:	        Radiology

## 2022-10-12 NOTE — PROGRESS NOTE ADULT - SUBJECTIVE AND OBJECTIVE BOX
[facility] DIV[facility] of INFECTIOUS DISEASE  Daron Bañuelos MD PhD, Carmen Tamyao MD, Nereyda Greenwood MD, Chase Manrique MD, Nikolas Wood MD  and providing coverage with Jyoti Nelson MD  Providing Infectious Disease Consultations at [facility], [facility], [facility]'s    Office# [phone] to schedule follow up appointments  Answering Service for urgent calls or New Consults [phone]  Cell# to text for urgent issues Daron Bañuelos [phone]     infectious diseases progress note:    KARMA TALAMANTES is a 62y y. o. Male patient    Overnight and events of the last 24hrs reviewed    Allergies    No Known Allergies    Intolerances        ANTIBIOTICS/RELEVANT:  antimicrobials  cefuroxime   Tablet 500 milliGRAM(s) Oral every 12 hours    immunologic:    OTHER:  ALBUTerol    90 MICROgram(s) HFA Inhaler 2 Puff(s) Inhalation every 6 hours PRN  amLODIPine   Tablet 5 milliGRAM(s) Oral daily  atorvastatin 20 milliGRAM(s) Oral at bedtime  dextrose 5%. 1000 milliLiter(s) IV Continuous <Continuous>  dextrose 5%. 1000 milliLiter(s) IV Continuous <Continuous>  dextrose 50% Injectable 25 Gram(s) IV Push once  dextrose 50% Injectable 12.5 Gram(s) IV Push once  dextrose 50% Injectable 25 Gram(s) IV Push once  dextrose Oral Gel 15 Gram(s) Oral once PRN  folic acid 1 milliGRAM(s) Oral daily  glucagon  Injectable 1 milliGRAM(s) IntraMuscular once  guaiFENesin Oral Liquid (Sugar-Free) 200 milliGRAM(s) Oral every 6 hours PRN  heparin   Injectable 5000 Unit(s) SubCutaneous every 8 hours  insulin glargine Injectable (LANTUS) 10 Unit(s) SubCutaneous at bedtime  insulin lispro (ADMELOG) corrective regimen sliding scale   SubCutaneous at bedtime  insulin lispro (ADMELOG) corrective regimen sliding scale   SubCutaneous three times a day before meals  insulin lispro Injectable (ADMELOG) 3 Unit(s) SubCutaneous three times a day before meals  metoprolol tartrate 25 milliGRAM(s) Oral two times a day  multivitamin 1 Tablet(s) Oral daily  sodium chloride 0.45%. 1000 milliLiter(s) IV Continuous <Continuous>      Objective:  Vital Signs Last 24 Hrs  T(C): 37.3 (12 Oct 2022 14:09), Max: 37.3 (12 Oct 2022 14:09)  T(F): 99.1 (12 Oct 2022 14:09), Max: 99.1 (12 Oct 2022 14:09)  HR: 76 (12 Oct 2022 14:09) (70 - 79)  BP: 152/82 (12 Oct 2022 14:09) (135/81 - 159/70)  BP(mean): --  RR: 18 (12 Oct 2022 14:09) (18 - 18)  SpO2: 96% (12 Oct 2022 14:09) (92% - 96%)    Parameters below as of 12 Oct 2022 14:09  Patient On (Oxygen Delivery Method): room air        T(C): 37.3 (10-12-22 @ 14:09), Max: 37.3 (10-12-22 @ 14:09)  T(C): 37.3 (10-12-22 @ 14:09), Max: 37.3 (10-12-22 @ 14:09)  T(C): 37.3 (10-12-22 @ 14:09), Max: 37.3 (10-12-22 @ 14:09)    PHYSICAL EXAM:  HEENT: NC atraumatic  Neck: supple  Respiratory: no accessory muscle use, breathing comfortably  Cardiovascular: distant  Gastrointestinal: normal appearing, nondistended  Extremities: no clubbing, no cyanosis,        LABS:                          12.2   6.61  )-----------( 536      ( 12 Oct 2022 06:43 )             37.5       WBC  6.61 10-12 @ 06:43  6.66 10-11 @ 07:52  6.83 10-10 @ 06:13  6.60 10-09 @ 04:49  6.93 10-08 @ 12:25      10-12    140  |  111<H>  |  28<H>  ----------------------------<  236<H>  4.0   |  20<L>  |  1.90<H>    Ca    9.9      12 Oct 2022 13:20        Creatinine, Serum: 1.90 mg/dL (10-12-22 @ 13:20)  Creatinine, Serum: 2.00 mg/dL (10-11-22 @ 07:52)  Creatinine, Serum: 2.50 mg/dL (10-10-22 @ 06:13)  Creatinine, Serum: 3.40 mg/dL (10-09-22 @ 04:49)  Creatinine, Serum: 3.40 mg/dL (10-09-22 @ 00:36)  Creatinine, Serum: 3.40 mg/dL (10-08-22 @ 22:19)  Creatinine, Serum: 3.30 mg/dL (10-08-22 @ 22:19)  Creatinine, Serum: 3.90 mg/dL (10-08-22 @ 12:25)                INFLAMMATORY MARKERS      MICROBIOLOGY:              RADIOLOGY & ADDITIONAL STUDIES:

## 2022-10-12 NOTE — PROGRESS NOTE ADULT - ASSESSMENT
62-year-old male with past medical hx of diabetes hypertension hyperlipidemia here for elevated blood sugar for the last few days.  Wife states patient also has been more confused but only at night.  Patient also noted to have shaking and tremors throughout the day.  Patient denies any cough fever chills numbness tingling or weakness no trauma or falls.  Of note patient quit drinking wine 1 week ago usually drinks about 3 to 4 glasses of homemade wine.  Patient denies any chest pain shortness of breath nausea vomiting.  Patient states had diarrhea few days ago now resolved.    1 PNA with metabolic encephalopathy POA   - cw abx  - id fu   - will monitor    2 Uncontrolled dm  - endo fu appreciated   - monitor FS  - ISS    3 HTN  - cw home meds  - dash diet    4 HLD  - cw statin      5 ADRIANNA  - renal fu  - monitor cr     dc planning in am if stable

## 2022-10-12 NOTE — PROGRESS NOTE ADULT - SUBJECTIVE AND OBJECTIVE BOX
Patient is a 62y Male with a known history of :  Uncontrolled type 2 diabetes mellitus with hyperglycemia, with long-term current use of insulin [E11.65]      HPI:  62-year-old male with past medical hx of T2 diabetes hypertension hyperlipidemia here for elevated blood sugar for the last few days.  Wife states patient also has been more confused but only at night.  Patient also noted to have shaking and tremors throughout the day.  Patient denies any cough fever chills numbness tingling or weakness no trauma or falls.  Of note patient quit drinking wine 1 week ago usually drinks about 3 to 4 glasses of homemade wine.  Patient denies any chest pain shortness of breath nausea vomiting.  Patient states had diarrhea few days ago now resolved. (08 Oct 2022 19:59)      Pt reports feeling improved.   No cp SOB.     REVIEW OF SYSTEMS:    All other review of systems is negative unless indicated above        MEDICATIONS  (STANDING):  amLODIPine   Tablet 5 milliGRAM(s) Oral daily  atorvastatin 20 milliGRAM(s) Oral at bedtime  cefuroxime   Tablet 500 milliGRAM(s) Oral every 12 hours  dextrose 5%. 1000 milliLiter(s) (100 mL/Hr) IV Continuous <Continuous>  dextrose 5%. 1000 milliLiter(s) (50 mL/Hr) IV Continuous <Continuous>  dextrose 50% Injectable 25 Gram(s) IV Push once  dextrose 50% Injectable 12.5 Gram(s) IV Push once  dextrose 50% Injectable 25 Gram(s) IV Push once  folic acid 1 milliGRAM(s) Oral daily  glucagon  Injectable 1 milliGRAM(s) IntraMuscular once  heparin   Injectable 5000 Unit(s) SubCutaneous every 8 hours  insulin glargine Injectable (LANTUS) 10 Unit(s) SubCutaneous at bedtime  insulin lispro (ADMELOG) corrective regimen sliding scale   SubCutaneous at bedtime  insulin lispro (ADMELOG) corrective regimen sliding scale   SubCutaneous three times a day before meals  insulin lispro Injectable (ADMELOG) 3 Unit(s) SubCutaneous three times a day before meals  metoprolol tartrate 25 milliGRAM(s) Oral two times a day  multivitamin 1 Tablet(s) Oral daily  sodium chloride 0.45%. 1000 milliLiter(s) (60 mL/Hr) IV Continuous <Continuous>    MEDICATIONS  (PRN):  ALBUTerol    90 MICROgram(s) HFA Inhaler 2 Puff(s) Inhalation every 6 hours PRN Shortness of Breath and/or Wheezing  dextrose Oral Gel 15 Gram(s) Oral once PRN Blood Glucose LESS THAN 70 milliGRAM(s)/deciliter  guaiFENesin Oral Liquid (Sugar-Free) 200 milliGRAM(s) Oral every 6 hours PRN Cough      ALLERGIES: No Known Allergies      FAMILY HISTORY:  No pertinent family history in first degree relatives        PHYSICAL EXAMINATION:  -----------------------------  T(C): 37.3 (10-12-22 @ 14:09), Max: 37.3 (10-12-22 @ 14:09)  HR: 76 (10-12-22 @ 14:09) (70 - 79)  BP: 152/82 (10-12-22 @ 14:09) (135/81 - 159/70)  RR: 18 (10-12-22 @ 14:09) (18 - 18)  SpO2: 96% (10-12-22 @ 14:09) (92% - 96%)  Wt(kg): --    10-11 @ 07:01  -  10-12 @ 07:00  --------------------------------------------------------  IN:    Oral Fluid: 237 mL    sodium chloride 0.45%: 1440 mL  Total IN: 1677 mL    OUT:    Voided (mL): 275 mL  Total OUT: 275 mL    Total NET: 1402 mL      10-12 @ 07:01  -  10-12 @ 15:09  --------------------------------------------------------  IN:    Oral Fluid: 237 mL    sodium chloride 0.45%: 360 mL  Total IN: 597 mL    OUT:    Voided (mL): 1050 mL  Total OUT: 1050 mL    Total NET: -453 mL      PHYSICAL EXAM:    Constitutional: NAD, awake and alert, well-developed  Eyes:  EOMI,  Pupils round, No oral cyanosis.  HEENT: No exudate or erythema  Pulmonary: Non-labored, breath sounds are clear bilaterally, No wheezing, rales or rhonchi  Cardiovascular: Regular, S1 and S2, No murmurs, rubs, gallops oir clicks  Gastrointestinal: Bowel Sounds present, soft, nontender.   Ext: No significant LE edema  Neurological: Alert, no gross focal motor deficits  Skin: No rashes.  Psych:  Mood & affect appropriate    LABS: All Labs Reviewed:        LABS:   --------  10-12    140  |  111<H>  |  28<H>  ----------------------------<  236<H>  4.0   |  20<L>  |  1.90<H>    Ca    9.9      12 Oct 2022 13:20                           12.2   6.61  )-----------( 536      ( 12 Oct 2022 06:43 )             37.5

## 2022-10-12 NOTE — PROGRESS NOTE ADULT - SUBJECTIVE AND OBJECTIVE BOX
Patient is a 62y old  Male who presents with a chief complaint of AMS (10 Oct 2022 09:16)    Patient seen in follow up for ADRIANNA on CKD.        PAST MEDICAL HISTORY:  Alcohol use      MEDICATIONS  (STANDING):  amLODIPine   Tablet 5 milliGRAM(s) Oral daily  atorvastatin 20 milliGRAM(s) Oral at bedtime  cefuroxime   Tablet 500 milliGRAM(s) Oral every 12 hours  dextrose 5%. 1000 milliLiter(s) (100 mL/Hr) IV Continuous <Continuous>  dextrose 5%. 1000 milliLiter(s) (50 mL/Hr) IV Continuous <Continuous>  dextrose 50% Injectable 25 Gram(s) IV Push once  dextrose 50% Injectable 12.5 Gram(s) IV Push once  dextrose 50% Injectable 25 Gram(s) IV Push once  folic acid 1 milliGRAM(s) Oral daily  glucagon  Injectable 1 milliGRAM(s) IntraMuscular once  heparin   Injectable 5000 Unit(s) SubCutaneous every 8 hours  insulin glargine Injectable (LANTUS) 10 Unit(s) SubCutaneous at bedtime  insulin lispro (ADMELOG) corrective regimen sliding scale   SubCutaneous at bedtime  insulin lispro (ADMELOG) corrective regimen sliding scale   SubCutaneous three times a day before meals  insulin lispro Injectable (ADMELOG) 3 Unit(s) SubCutaneous three times a day before meals  metoprolol tartrate 25 milliGRAM(s) Oral two times a day  multivitamin 1 Tablet(s) Oral daily  sodium chloride 0.45%. 1000 milliLiter(s) (60 mL/Hr) IV Continuous <Continuous>    MEDICATIONS  (PRN):  ALBUTerol    90 MICROgram(s) HFA Inhaler 2 Puff(s) Inhalation every 6 hours PRN Shortness of Breath and/or Wheezing  dextrose Oral Gel 15 Gram(s) Oral once PRN Blood Glucose LESS THAN 70 milliGRAM(s)/deciliter  guaiFENesin Oral Liquid (Sugar-Free) 200 milliGRAM(s) Oral every 6 hours PRN Cough    T(C): 37 (10-12-22 @ 04:58), Max: 37 (10-12-22 @ 04:58)  HR: 71 (10-12-22 @ 04:58) (68 - 82)  BP: 159/70 (10-12-22 @ 04:58) (131/81 - 159/70)  RR: 18 (10-12-22 @ 04:58)  SpO2: 94% (10-12-22 @ 04:58)  Wt(kg): --  I&O's Detail    11 Oct 2022 07:01  -  12 Oct 2022 07:00  --------------------------------------------------------  IN:    Oral Fluid: 237 mL    sodium chloride 0.45%: 1440 mL  Total IN: 1677 mL    OUT:    Voided (mL): 275 mL  Total OUT: 275 mL    Total NET: 1402 mL      12 Oct 2022 07:01  -  12 Oct 2022 11:19  --------------------------------------------------------  IN:  Total IN: 0 mL    OUT:    Voided (mL): 575 mL  Total OUT: 575 mL    Total NET: -575 mL            PHYSICAL EXAM:  General: No distress  Respiratory: b/l air entry  Cardiovascular: S1 S2  Gastrointestinal: soft  Extremities:  no edema          LABORATORY:                        12.2   6.61  )-----------( 536      ( 12 Oct 2022 06:43 )             37.5     10-11    141  |  111<H>  |  37<H>  ----------------------------<  150<H>  3.9   |  20<L>  |  2.00<H>    Ca    9.5      11 Oct 2022 07:52      Sodium, Serum: 141 mmol/L (10-11 @ 07:52)    Potassium, Serum: 3.9 mmol/L (10-11 @ 07:52)    Hemoglobin: 12.2 g/dL (10-12 @ 06:43)  Hemoglobin: 12.2 g/dL (10-11 @ 07:52)  Hemoglobin: 12.1 g/dL (10-10 @ 06:13)    Creatinine, Serum 2.00 (10-11 @ 07:52)  Creatinine, Serum 2.50 (10-10 @ 06:13)

## 2022-10-12 NOTE — PROGRESS NOTE ADULT - ASSESSMENT
62M with DM2, HTN, HLD, CKD a/w shaking/sob found with NING PNA and acute on chronic renal insufficiency    ams  hld  htn  pna  acute infection  DM    Suggest:    1. NING PNA  - C/w IV antibiotics - Zosyn IV    2. HTN  - Continue with amlodipine 5mg daily  - Continue with metoprolol 25mg twice daily.  - BP has been mildly increased.  Can increase amlodipine if remains more often high.     3.Hyperlipidemia  - C/w Lipitor     4. DVT prophylaxis with Heparin SQ

## 2022-10-13 ENCOUNTER — TRANSCRIPTION ENCOUNTER (OUTPATIENT)
Age: 62
End: 2022-10-13

## 2022-10-13 VITALS
HEART RATE: 82 BPM | OXYGEN SATURATION: 93 % | RESPIRATION RATE: 17 BRPM | TEMPERATURE: 98 F | SYSTOLIC BLOOD PRESSURE: 136 MMHG | DIASTOLIC BLOOD PRESSURE: 75 MMHG

## 2022-10-13 LAB
ANION GAP SERPL CALC-SCNC: 8 MMOL/L — SIGNIFICANT CHANGE UP (ref 5–17)
BUN SERPL-MCNC: 23 MG/DL — SIGNIFICANT CHANGE UP (ref 7–23)
CALCIUM SERPL-MCNC: 9.7 MG/DL — SIGNIFICANT CHANGE UP (ref 8.5–10.1)
CHLORIDE SERPL-SCNC: 111 MMOL/L — HIGH (ref 96–108)
CO2 SERPL-SCNC: 24 MMOL/L — SIGNIFICANT CHANGE UP (ref 22–31)
CREAT SERPL-MCNC: 1.8 MG/DL — HIGH (ref 0.5–1.3)
CULTURE RESULTS: SIGNIFICANT CHANGE UP
CULTURE RESULTS: SIGNIFICANT CHANGE UP
EGFR: 43 ML/MIN/1.73M2 — LOW
GLUCOSE SERPL-MCNC: 147 MG/DL — HIGH (ref 70–99)
HCT VFR BLD CALC: 38 % — LOW (ref 39–50)
HGB BLD-MCNC: 12.5 G/DL — LOW (ref 13–17)
MCHC RBC-ENTMCNC: 30.3 PG — SIGNIFICANT CHANGE UP (ref 27–34)
MCHC RBC-ENTMCNC: 32.9 GM/DL — SIGNIFICANT CHANGE UP (ref 32–36)
MCV RBC AUTO: 92 FL — SIGNIFICANT CHANGE UP (ref 80–100)
NRBC # BLD: 0 /100 WBCS — SIGNIFICANT CHANGE UP (ref 0–0)
PLATELET # BLD AUTO: 587 K/UL — HIGH (ref 150–400)
POTASSIUM SERPL-MCNC: 4.2 MMOL/L — SIGNIFICANT CHANGE UP (ref 3.5–5.3)
POTASSIUM SERPL-SCNC: 4.2 MMOL/L — SIGNIFICANT CHANGE UP (ref 3.5–5.3)
RBC # BLD: 4.13 M/UL — LOW (ref 4.2–5.8)
RBC # FLD: 13.9 % — SIGNIFICANT CHANGE UP (ref 10.3–14.5)
SODIUM SERPL-SCNC: 143 MMOL/L — SIGNIFICANT CHANGE UP (ref 135–145)
SPECIMEN SOURCE: SIGNIFICANT CHANGE UP
SPECIMEN SOURCE: SIGNIFICANT CHANGE UP
WBC # BLD: 8.13 K/UL — SIGNIFICANT CHANGE UP (ref 3.8–10.5)
WBC # FLD AUTO: 8.13 K/UL — SIGNIFICANT CHANGE UP (ref 3.8–10.5)

## 2022-10-13 PROCEDURE — 80307 DRUG TEST PRSMV CHEM ANLYZR: CPT

## 2022-10-13 PROCEDURE — 87641 MR-STAPH DNA AMP PROBE: CPT

## 2022-10-13 PROCEDURE — 0225U NFCT DS DNA&RNA 21 SARSCOV2: CPT

## 2022-10-13 PROCEDURE — 76770 US EXAM ABDO BACK WALL COMP: CPT

## 2022-10-13 PROCEDURE — U0005: CPT

## 2022-10-13 PROCEDURE — 96374 THER/PROPH/DIAG INJ IV PUSH: CPT

## 2022-10-13 PROCEDURE — 81001 URINALYSIS AUTO W/SCOPE: CPT

## 2022-10-13 PROCEDURE — 70450 CT HEAD/BRAIN W/O DYE: CPT | Mod: MA

## 2022-10-13 PROCEDURE — 71045 X-RAY EXAM CHEST 1 VIEW: CPT

## 2022-10-13 PROCEDURE — 83605 ASSAY OF LACTIC ACID: CPT

## 2022-10-13 PROCEDURE — 93005 ELECTROCARDIOGRAM TRACING: CPT

## 2022-10-13 PROCEDURE — 82803 BLOOD GASES ANY COMBINATION: CPT

## 2022-10-13 PROCEDURE — 82962 GLUCOSE BLOOD TEST: CPT

## 2022-10-13 PROCEDURE — 82010 KETONE BODYS QUAN: CPT

## 2022-10-13 PROCEDURE — 99285 EMERGENCY DEPT VISIT HI MDM: CPT

## 2022-10-13 PROCEDURE — 87449 NOS EACH ORGANISM AG IA: CPT

## 2022-10-13 PROCEDURE — 80076 HEPATIC FUNCTION PANEL: CPT

## 2022-10-13 PROCEDURE — 87040 BLOOD CULTURE FOR BACTERIA: CPT

## 2022-10-13 PROCEDURE — 80048 BASIC METABOLIC PNL TOTAL CA: CPT

## 2022-10-13 PROCEDURE — 86803 HEPATITIS C AB TEST: CPT

## 2022-10-13 PROCEDURE — 74176 CT ABD & PELVIS W/O CONTRAST: CPT | Mod: MA

## 2022-10-13 PROCEDURE — 83036 HEMOGLOBIN GLYCOSYLATED A1C: CPT

## 2022-10-13 PROCEDURE — 71250 CT THORAX DX C-: CPT

## 2022-10-13 PROCEDURE — U0003: CPT

## 2022-10-13 PROCEDURE — 82009 KETONE BODYS QUAL: CPT

## 2022-10-13 PROCEDURE — 80053 COMPREHEN METABOLIC PANEL: CPT

## 2022-10-13 PROCEDURE — 85025 COMPLETE CBC W/AUTO DIFF WBC: CPT

## 2022-10-13 PROCEDURE — 85027 COMPLETE CBC AUTOMATED: CPT

## 2022-10-13 PROCEDURE — 83735 ASSAY OF MAGNESIUM: CPT

## 2022-10-13 PROCEDURE — 87070 CULTURE OTHR SPECIMN AEROBIC: CPT

## 2022-10-13 PROCEDURE — 87640 STAPH A DNA AMP PROBE: CPT

## 2022-10-13 PROCEDURE — 87086 URINE CULTURE/COLONY COUNT: CPT

## 2022-10-13 PROCEDURE — 36415 COLL VENOUS BLD VENIPUNCTURE: CPT

## 2022-10-13 PROCEDURE — 84100 ASSAY OF PHOSPHORUS: CPT

## 2022-10-13 RX ORDER — LOSARTAN POTASSIUM 100 MG/1
1 TABLET, FILM COATED ORAL
Qty: 0 | Refills: 0 | DISCHARGE

## 2022-10-13 RX ORDER — METFORMIN HYDROCHLORIDE 850 MG/1
4 TABLET ORAL
Qty: 0 | Refills: 0 | DISCHARGE

## 2022-10-13 RX ADMIN — HEPARIN SODIUM 5000 UNIT(S): 5000 INJECTION INTRAVENOUS; SUBCUTANEOUS at 05:42

## 2022-10-13 RX ADMIN — AMLODIPINE BESYLATE 5 MILLIGRAM(S): 2.5 TABLET ORAL at 05:41

## 2022-10-13 RX ADMIN — Medication 3 UNIT(S): at 12:25

## 2022-10-13 RX ADMIN — Medication 500 MILLIGRAM(S): at 05:41

## 2022-10-13 RX ADMIN — Medication 25 MILLIGRAM(S): at 05:41

## 2022-10-13 RX ADMIN — Medication 1 TABLET(S): at 11:06

## 2022-10-13 RX ADMIN — SODIUM CHLORIDE 60 MILLILITER(S): 9 INJECTION, SOLUTION INTRAVENOUS at 06:37

## 2022-10-13 RX ADMIN — Medication 1 MILLIGRAM(S): at 11:06

## 2022-10-13 RX ADMIN — Medication 4: at 12:25

## 2022-10-13 RX ADMIN — Medication 3 UNIT(S): at 08:22

## 2022-10-13 NOTE — DISCHARGE NOTE PROVIDER - NSDCCPCAREPLAN_GEN_ALL_CORE_FT
PRINCIPAL DISCHARGE DIAGNOSIS  Diagnosis: ADRIANNA (acute kidney injury)  Assessment and Plan of Treatment:

## 2022-10-13 NOTE — PROGRESS NOTE ADULT - SUBJECTIVE AND OBJECTIVE BOX
[facility] DIVISION of INFECTIOUS DISEASE  Daron Bañuelos MD PhD, Carmen Tamayo MD, Nereyda Greenwood MD, Chase Manrique MD, Nikolas Wood MD  and providing coverage with Jyoti Nelson MD  Providing Infectious Disease Consultations at [facility], [facility], [facility]'s    Office# [phone] to schedule follow up appointments  Answering Service for urgent calls or New Consults [phone]  Cell# to text for urgent issues Daron Bañuelos [phone]     infectious diseases progress note:    KARMA TALAMANTES is a 62y y. o. Male patient    Overnight and events of the last 24hrs reviewed    Allergies    No Known Allergies    Intolerances        ANTIBIOTICS/RELEVANT:  antimicrobials  cefuroxime   Tablet 500 milliGRAM(s) Oral every 12 hours    immunologic:    OTHER:  ALBUTerol    90 MICROgram(s) HFA Inhaler 2 Puff(s) Inhalation every 6 hours PRN  amLODIPine   Tablet 5 milliGRAM(s) Oral daily  atorvastatin 20 milliGRAM(s) Oral at bedtime  dextrose 5%. 1000 milliLiter(s) IV Continuous <Continuous>  dextrose 5%. 1000 milliLiter(s) IV Continuous <Continuous>  dextrose 50% Injectable 25 Gram(s) IV Push once  dextrose 50% Injectable 12.5 Gram(s) IV Push once  dextrose 50% Injectable 25 Gram(s) IV Push once  dextrose Oral Gel 15 Gram(s) Oral once PRN  folic acid 1 milliGRAM(s) Oral daily  glucagon  Injectable 1 milliGRAM(s) IntraMuscular once  guaiFENesin Oral Liquid (Sugar-Free) 200 milliGRAM(s) Oral every 6 hours PRN  heparin   Injectable 5000 Unit(s) SubCutaneous every 8 hours  insulin glargine Injectable (LANTUS) 10 Unit(s) SubCutaneous at bedtime  insulin lispro (ADMELOG) corrective regimen sliding scale   SubCutaneous three times a day before meals  insulin lispro (ADMELOG) corrective regimen sliding scale   SubCutaneous at bedtime  insulin lispro Injectable (ADMELOG) 3 Unit(s) SubCutaneous three times a day before meals  metoprolol tartrate 25 milliGRAM(s) Oral two times a day  multivitamin 1 Tablet(s) Oral daily  sodium chloride 0.45%. 1000 milliLiter(s) IV Continuous <Continuous>      Objective:  Vital Signs Last 24 Hrs  T(C): 37.1 (13 Oct 2022 04:38), Max: 37.3 (12 Oct 2022 14:09)  T(F): 98.7 (13 Oct 2022 04:38), Max: 99.1 (12 Oct 2022 14:09)  HR: 73 (13 Oct 2022 04:38) (73 - 99)  BP: 156/80 (13 Oct 2022 04:38) (123/69 - 156/80)  BP(mean): --  RR: 18 (13 Oct 2022 04:38) (18 - 18)  SpO2: 92% (13 Oct 2022 04:38) (92% - 96%)    Parameters below as of 13 Oct 2022 04:38  Patient On (Oxygen Delivery Method): room air        T(C): 37.1 (10-13-22 @ 04:38), Max: 37.3 (10-12-22 @ 14:09)  T(C): 37.1 (10-13-22 @ 04:38), Max: 37.3 (10-12-22 @ 14:09)  T(C): 37.1 (10-13-22 @ 04:38), Max: 37.3 (10-12-22 @ 14:09)    PHYSICAL EXAM:  HEENT: NC atraumatic  Neck: supple  Respiratory: no accessory muscle use, breathing comfortably  Cardiovascular: distant  Gastrointestinal: normal appearing, nondistended  Extremities: no clubbing, no cyanosis,        LABS:                          12.5   8.13  )-----------( 587      ( 13 Oct 2022 08:26 )             38.0       WBC  8.13 10-13 @ 08:26  6.61 10-12 @ 06:43  6.66 10-11 @ 07:52  6.83 10-10 @ 06:13  6.60 10-09 @ 04:49  6.93 10-08 @ 12:25      10-13    143  |  111<H>  |  23  ----------------------------<  147<H>  4.2   |  24  |  1.80<H>    Ca    9.7      13 Oct 2022 08:26        Creatinine, Serum: 1.80 mg/dL (10-13-22 @ 08:26)  Creatinine, Serum: 1.90 mg/dL (10-12-22 @ 13:20)  Creatinine, Serum: 2.00 mg/dL (10-11-22 @ 07:52)  Creatinine, Serum: 2.50 mg/dL (10-10-22 @ 06:13)  Creatinine, Serum: 3.40 mg/dL (10-09-22 @ 04:49)  Creatinine, Serum: 3.40 mg/dL (10-09-22 @ 00:36)  Creatinine, Serum: 3.40 mg/dL (10-08-22 @ 22:19)  Creatinine, Serum: 3.30 mg/dL (10-08-22 @ 22:19)  Creatinine, Serum: 3.90 mg/dL (10-08-22 @ 12:25)                INFLAMMATORY MARKERS      MICROBIOLOGY:              RADIOLOGY & ADDITIONAL STUDIES:

## 2022-10-13 NOTE — PROGRESS NOTE ADULT - PROBLEM SELECTOR PLAN 1
cont lantus 10 units qhs  cont admelog 3 units 3x/day before meals  cont admelog corrective scale coverage qac/qhs  cont cons cho diet  goal bg 100-180 in hosp setting
cont lantus 10 units qhs  cont admelog 3 units 3x/day before meals  cont admelog corrective scale coverage qac/qhs  cont cons cho diet  goal bg 100-180 in hosp setting

## 2022-10-13 NOTE — PROGRESS NOTE ADULT - SUBJECTIVE AND OBJECTIVE BOX
Date/Time Patient Seen:  		  Referring MD:   Data Reviewed	       Patient is a 62y old  Male who presents with a chief complaint of AMS (12 Oct 2022 17:11)      Subjective/HPI     PAST MEDICAL & SURGICAL HISTORY:  Alcohol use    No significant past surgical history          Medication list         MEDICATIONS  (STANDING):  amLODIPine   Tablet 5 milliGRAM(s) Oral daily  atorvastatin 20 milliGRAM(s) Oral at bedtime  cefuroxime   Tablet 500 milliGRAM(s) Oral every 12 hours  dextrose 5%. 1000 milliLiter(s) (100 mL/Hr) IV Continuous <Continuous>  dextrose 5%. 1000 milliLiter(s) (50 mL/Hr) IV Continuous <Continuous>  dextrose 50% Injectable 25 Gram(s) IV Push once  dextrose 50% Injectable 12.5 Gram(s) IV Push once  dextrose 50% Injectable 25 Gram(s) IV Push once  folic acid 1 milliGRAM(s) Oral daily  glucagon  Injectable 1 milliGRAM(s) IntraMuscular once  heparin   Injectable 5000 Unit(s) SubCutaneous every 8 hours  insulin glargine Injectable (LANTUS) 10 Unit(s) SubCutaneous at bedtime  insulin lispro (ADMELOG) corrective regimen sliding scale   SubCutaneous at bedtime  insulin lispro (ADMELOG) corrective regimen sliding scale   SubCutaneous three times a day before meals  insulin lispro Injectable (ADMELOG) 3 Unit(s) SubCutaneous three times a day before meals  metoprolol tartrate 25 milliGRAM(s) Oral two times a day  multivitamin 1 Tablet(s) Oral daily  sodium chloride 0.45%. 1000 milliLiter(s) (60 mL/Hr) IV Continuous <Continuous>    MEDICATIONS  (PRN):  ALBUTerol    90 MICROgram(s) HFA Inhaler 2 Puff(s) Inhalation every 6 hours PRN Shortness of Breath and/or Wheezing  dextrose Oral Gel 15 Gram(s) Oral once PRN Blood Glucose LESS THAN 70 milliGRAM(s)/deciliter  guaiFENesin Oral Liquid (Sugar-Free) 200 milliGRAM(s) Oral every 6 hours PRN Cough         Vitals log        ICU Vital Signs Last 24 Hrs  T(C): 37.1 (13 Oct 2022 04:38), Max: 37.3 (12 Oct 2022 14:09)  T(F): 98.7 (13 Oct 2022 04:38), Max: 99.1 (12 Oct 2022 14:09)  HR: 73 (13 Oct 2022 04:38) (73 - 99)  BP: 156/80 (13 Oct 2022 04:38) (123/69 - 156/80)  BP(mean): --  ABP: --  ABP(mean): --  RR: 18 (13 Oct 2022 04:38) (18 - 18)  SpO2: 92% (13 Oct 2022 04:38) (92% - 96%)    O2 Parameters below as of 13 Oct 2022 04:38  Patient On (Oxygen Delivery Method): room air                 Input and Output:  I&O's Detail    11 Oct 2022 07:01  -  12 Oct 2022 07:00  --------------------------------------------------------  IN:    Oral Fluid: 237 mL    sodium chloride 0.45%: 1440 mL  Total IN: 1677 mL    OUT:    Voided (mL): 275 mL  Total OUT: 275 mL    Total NET: 1402 mL      12 Oct 2022 07:01  -  13 Oct 2022 06:01  --------------------------------------------------------  IN:    Oral Fluid: 237 mL    sodium chloride 0.45%: 360 mL  Total IN: 597 mL    OUT:    Voided (mL): 1050 mL  Total OUT: 1050 mL    Total NET: -453 mL          Lab Data                        12.2   6.61  )-----------( 536      ( 12 Oct 2022 06:43 )             37.5     10-12    140  |  111<H>  |  28<H>  ----------------------------<  236<H>  4.0   |  20<L>  |  1.90<H>    Ca    9.9      12 Oct 2022 13:20              Review of Systems	      Objective     Physical Examination    heart s1s2  lung dec BS  head nc  on RA    Pertinent Lab findings & Imaging      Essence:  NO   Adequate UO     I&O's Detail    11 Oct 2022 07:01  -  12 Oct 2022 07:00  --------------------------------------------------------  IN:    Oral Fluid: 237 mL    sodium chloride 0.45%: 1440 mL  Total IN: 1677 mL    OUT:    Voided (mL): 275 mL  Total OUT: 275 mL    Total NET: 1402 mL      12 Oct 2022 07:01  -  13 Oct 2022 06:01  --------------------------------------------------------  IN:    Oral Fluid: 237 mL    sodium chloride 0.45%: 360 mL  Total IN: 597 mL    OUT:    Voided (mL): 1050 mL  Total OUT: 1050 mL    Total NET: -453 mL               Discussed with:     Cultures:	        Radiology

## 2022-10-13 NOTE — PROGRESS NOTE ADULT - SUBJECTIVE AND OBJECTIVE BOX
[facility] Cardiology    CHIEF COMPLAINT: Patient is a 62y old  Male who presents with a chief complaint of AMS (13 Oct 2022 06:01)      Follow Up: [ ] Chest Pain      [ ] Dyspnea     [ ] Palpitations    [ ] Atrial Fibrillation     [ ] Ventricular Dysrhythmia    [ ] Abnormal EKG                      [ ] Abnormal Cardiac Enzymes     [ ] Valvular Disease    HPI:  62-year-old male with past medical hx of T2 diabetes hypertension hyperlipidemia here for elevated blood sugar for the last few days.  Wife states patient also has been more confused but only at night.  Patient also noted to have shaking and tremors throughout the day.  Patient denies any cough fever chills numbness tingling or weakness no trauma or falls.  Of note patient quit drinking wine 1 week ago usually drinks about 3 to 4 glasses of homemade wine.  Patient denies any chest pain shortness of breath nausea vomiting.  Patient states had diarrhea few days ago now resolved. (08 Oct 2022 19:59)    PAST MEDICAL & SURGICAL HISTORY:  Alcohol use      No significant past surgical history        MEDICATIONS  (STANDING):  amLODIPine   Tablet 5 milliGRAM(s) Oral daily  atorvastatin 20 milliGRAM(s) Oral at bedtime  cefuroxime   Tablet 500 milliGRAM(s) Oral every 12 hours  dextrose 5%. 1000 milliLiter(s) (100 mL/Hr) IV Continuous <Continuous>  dextrose 5%. 1000 milliLiter(s) (50 mL/Hr) IV Continuous <Continuous>  dextrose 50% Injectable 25 Gram(s) IV Push once  dextrose 50% Injectable 12.5 Gram(s) IV Push once  dextrose 50% Injectable 25 Gram(s) IV Push once  folic acid 1 milliGRAM(s) Oral daily  glucagon  Injectable 1 milliGRAM(s) IntraMuscular once  heparin   Injectable 5000 Unit(s) SubCutaneous every 8 hours  insulin glargine Injectable (LANTUS) 10 Unit(s) SubCutaneous at bedtime  insulin lispro (ADMELOG) corrective regimen sliding scale   SubCutaneous at bedtime  insulin lispro (ADMELOG) corrective regimen sliding scale   SubCutaneous three times a day before meals  insulin lispro Injectable (ADMELOG) 3 Unit(s) SubCutaneous three times a day before meals  metoprolol tartrate 25 milliGRAM(s) Oral two times a day  multivitamin 1 Tablet(s) Oral daily  sodium chloride 0.45%. 1000 milliLiter(s) (60 mL/Hr) IV Continuous <Continuous>    MEDICATIONS  (PRN):  ALBUTerol    90 MICROgram(s) HFA Inhaler 2 Puff(s) Inhalation every 6 hours PRN Shortness of Breath and/or Wheezing  dextrose Oral Gel 15 Gram(s) Oral once PRN Blood Glucose LESS THAN 70 milliGRAM(s)/deciliter  guaiFENesin Oral Liquid (Sugar-Free) 200 milliGRAM(s) Oral every 6 hours PRN Cough    Allergies    No Known Allergies    Intolerances        REVIEW OF SYSTEMS:    CONSTITUTIONAL: No weakness, fevers or chills.   EYES/ENT: No visual changes;    NECK: No pain or stiffness  RESPIRATORY: No cough, wheezing, No shortness of breath  CARDIOVASCULAR: No chest pain or palpitations  GASTROINTESTINAL: No abdominal pain, or hematochezia.  GENITOURINARY: No dysuria orhematuria  NEUROLOGICAL: No numbness or weakness  SKIN: No itching, burning, rashes  All other review of systems is negative unless indicated above    Vital Signs Last 24 Hrs  T(C): 37.1 (13 Oct 2022 04:38), Max: 37.3 (12 Oct 2022 14:09)  T(F): 98.7 (13 Oct 2022 04:38), Max: 99.1 (12 Oct 2022 14:09)  HR: 73 (13 Oct 2022 04:38) (73 - 99)  BP: 156/80 (13 Oct 2022 04:38) (123/69 - 156/80)  BP(mean): --  RR: 18 (13 Oct 2022 04:38) (18 - 18)  SpO2: 92% (13 Oct 2022 04:38) (92% - 96%)    Parameters below as of 13 Oct 2022 04:38  Patient On (Oxygen Delivery Method): room air      I&O's Summary    12 Oct 2022 07:01  -  13 Oct 2022 07:00  --------------------------------------------------------  IN: 1377 mL / OUT: 1050 mL / NET: 327 mL        PHYSICAL EXAM:  Constitutional: NAD  Neurological: Alert, no focal deficits  HEENT: no JVD, EOMI  Cardiovascular: Regular, S1 and S2, no murmur  Pulmonary: breath sounds bilaterally  Gastrointestinal: Bowel Sounds present, soft, nontender  EXT:  no peripheral edema  Skin: No rashes.  Psych:  Mood calm  LABS: All Labs Reviewed:                          12.5   8.13  )-----------( 587      ( 13 Oct 2022 08:26 )             38.0     10-13    143  |  111<H>  |  23  ----------------------------<  147<H>  4.2   |  24  |  1.80<H>    Ca    9.7      13 Oct 2022 08:26      62-year-old male with past medical hx of T2 diabetes hypertension hyperlipidemia here for elevated blood sugar for the last few days.  Wife states patient also has been more confused but only at night.  Patient also noted to have shaking and tremors throughout the day.  Patient denies any cough fever chills numbness tingling or weakness no trauma or falls.  Of note patient quit drinking wine 1 week ago usually drinks about 3 to 4 glasses of homemade wine.  Patient denies any chest pain shortness of breath nausea vomiting.  Patient states had diarrhea few days ago now resolved. (08 Oct 2022 19:59)    · Assessment	  62M with DM2, HTN, HLD, CKD a/w shaking/sob found with NING PNA and acute on chronic renal insufficiency    ams  hld  htn  pna  acute infection  DM    Suggest:    1. NING PNA  - C/w IV antibiotics - Zosyn IV    2. HTN  - Continue with amlodipine 5mg daily  - Continue with metoprolol 25mg twice daily.  - BP has been mildly increased.  Consider increase amlodipine to 10mg if remains high.     3.Hyperlipidemia  - C/w Lipitor     4. DVT prophylaxis with Heparin SQ

## 2022-10-13 NOTE — DISCHARGE NOTE NURSING/CASE MANAGEMENT/SOCIAL WORK - PATIENT PORTAL LINK FT
You can access the FollowMyHealth Patient Portal offered by [facility] by registering at the following website: [email or web address]. By joining [employer] [facility]’s FollowMyHealth portal, you will also be able to view your health information using other applications (apps) compatible with our system.

## 2022-10-13 NOTE — DISCHARGE NOTE PROVIDER - NSDCMRMEDTOKEN_GEN_ALL_CORE_FT
amLODIPine 5 mg oral tablet: 1 tab(s) orally once a day  atorvastatin 20 mg oral tablet: 1 tab(s) orally once a day  cholecalciferol 50 mcg (2000 intl units) oral capsule: 1 cap(s) orally once a day  Farxiga 10 mg oral tablet: 1 tab(s) orally once a day  Januvia 100 mg oral tablet: 1 tab(s) orally once a day (in the morning)  metoprolol succinate 25 mg oral tablet, extended release: 1 tab(s) orally once a day

## 2022-10-13 NOTE — PROGRESS NOTE ADULT - ASSESSMENT
ADRIANNA on CKD 3: Prerenal azotemia, R/o retention  Pneumonia  Hypertension  Diabetes    Improving renal indices. Now close to baseline. To continue current meds. Monitor blood sugar levels. Insulin coverage as needed.   Monitor BP trend. Titrate BP meds as needed. Salt restriction. Avoid nephrotoxic meds as possible. Discussed with patients wife at the bedside.   D/w patient regarding need for out patient nephrology follow up. D/c planning.  ADRIANNA on CKD 3: Prerenal azotemia  Pneumonia  Hypertension  Diabetes    Improving renal indices. Now close to baseline. To continue current meds. Monitor blood sugar levels. Insulin coverage as needed.   Monitor BP trend. Titrate BP meds as needed. Salt restriction. Avoid nephrotoxic meds as possible. Discussed with patients wife at the bedside.   D/w patient regarding need for out patient nephrology follow up. D/c planning.

## 2022-10-13 NOTE — PROGRESS NOTE ADULT - SUBJECTIVE AND OBJECTIVE BOX
CAPILLARY BLOOD GLUCOSE      POCT Blood Glucose.: 143 mg/dL (13 Oct 2022 08:03)  POCT Blood Glucose.: 160 mg/dL (12 Oct 2022 21:56)  POCT Blood Glucose.: 214 mg/dL (12 Oct 2022 17:20)  POCT Blood Glucose.: 249 mg/dL (12 Oct 2022 12:29)      Vital Signs Last 24 Hrs  T(C): 37.1 (13 Oct 2022 04:38), Max: 37.3 (12 Oct 2022 14:09)  T(F): 98.7 (13 Oct 2022 04:38), Max: 99.1 (12 Oct 2022 14:09)  HR: 73 (13 Oct 2022 04:38) (73 - 99)  BP: 156/80 (13 Oct 2022 04:38) (123/69 - 156/80)  BP(mean): --  RR: 18 (13 Oct 2022 04:38) (18 - 18)  SpO2: 92% (13 Oct 2022 04:38) (92% - 96%)    Parameters below as of 13 Oct 2022 04:38  Patient On (Oxygen Delivery Method): room air        General: WN/WD NAD  Respiratory: CTA B/L  CV: RRR, S1S2, no murmurs, rubs or gallops  Abdominal: Soft, NT, ND +BS, Last BM  Extremities: No edema, + peripheral pulses     10-13    143  |  111<H>  |  23  ----------------------------<  147<H>  4.2   |  24  |  1.80<H>    Ca    9.7      13 Oct 2022 08:26        atorvastatin 20 milliGRAM(s) Oral at bedtime  dextrose 50% Injectable 25 Gram(s) IV Push once  dextrose 50% Injectable 12.5 Gram(s) IV Push once  dextrose 50% Injectable 25 Gram(s) IV Push once  dextrose Oral Gel 15 Gram(s) Oral once PRN  glucagon  Injectable 1 milliGRAM(s) IntraMuscular once  insulin glargine Injectable (LANTUS) 10 Unit(s) SubCutaneous at bedtime  insulin lispro (ADMELOG) corrective regimen sliding scale   SubCutaneous at bedtime  insulin lispro (ADMELOG) corrective regimen sliding scale   SubCutaneous three times a day before meals  insulin lispro Injectable (ADMELOG) 3 Unit(s) SubCutaneous three times a day before meals

## 2022-10-13 NOTE — DISCHARGE NOTE PROVIDER - HOSPITAL COURSE
62-year-old male with past medical hx of diabetes hypertension hyperlipidemia here for elevated blood sugar for the last few days.  Wife states patient also has been more confused but only at night.  Patient also noted to have shaking and tremors throughout the day.  Patient denies any cough fever chills numbness tingling or weakness no trauma or falls.  Of note patient quit drinking wine 1 week ago usually drinks about 3 to 4 glasses of homemade wine.  Patient denies any chest pain shortness of breath nausea vomiting.  Patient states had diarrhea few days ago now resolved.    1 PNA with metabolic encephalopathy POA   - monitor off  abx  - id fu   - will monitor    2 Uncontrolled dm  - endo fu appreciated   - monitor FS  - ISS  - dc on Farxiga and jorgeuvia till sees Dr Perlman in the office     3 HTN  - cw home meds  - dash diet    4 HLD  - cw statin      5 ADRIANNA  - renal fu  - monitor cr

## 2022-10-13 NOTE — PROGRESS NOTE ADULT - ASSESSMENT
62-year-old male with past medical hx of diabetes hypertension hyperlipidemia here for elevated blood sugar for the last few days.  Wife states patient also has been more confused but only at night.  Patient also noted to have shaking and tremors throughout the day.  Patient has been coughing now for several days and the wife reports she just thought he had a cold.  had diarrhea few days ago now resolved.     RECOMMENDATIONS  Cough, abn lung exam and imaging, concerning for PNA and prodrome with diarrhea suggesting this may be post viral, legionella, or typical bacterial process. Hyperglycemia may be due to acute infection.   -Zosyn started 10/8-early am  10/10-changed to Ceftriaxone   10/11 changed to cefuroxime 500mg PO BID with last day 10/12    obs off abx, follow up as outlined by pulm    From an ID standpoint no further requirement for inpatient status for the management of ID issues. Fine with discharge from ID standpoint when other medical issues no longer require inpatient care and social issues allow for a safe discharge plan. To schedule an outpatient ID follow up appointment please call our office at [phone]    Thank you for consulting us and involving us in the management of this most interesting and challenging case.  We will follow along in the care of this patient. Please call us at [phone] or text me directly on my cell# at [phone] with any concerns.

## 2022-10-13 NOTE — PROGRESS NOTE ADULT - PROVIDER SPECIALTY LIST ADULT
Cardiology
Hospitalist
Infectious Disease
Cardiology
Endocrinology
Endocrinology
Nephrology
Neurology
Neurology
Pulmonology
Cardiology
Hospitalist
Infectious Disease
Nephrology
Nephrology
Neurology
Pulmonology
Cardiology
Hospitalist
Hospitalist
Infectious Disease
Infectious Disease
Nephrology
Pulmonology
Pulmonology

## 2022-10-13 NOTE — PROGRESS NOTE ADULT - PROBLEM SELECTOR PROBLEM 1
Uncontrolled type 2 diabetes mellitus with hyperglycemia, with long-term current use of insulin
Uncontrolled type 2 diabetes mellitus with hyperglycemia, with long-term current use of insulin

## 2022-10-13 NOTE — DISCHARGE NOTE PROVIDER - CARE PROVIDER_API CALL
Perlman, Craig D ()  Internal Medicine  [address] [place] [address], [address]  [place] [address]  Phone: [phone]  Fax: [phone]  Follow Up Time:

## 2022-10-13 NOTE — PROGRESS NOTE ADULT - ASSESSMENT
62-year-old male with past medical hx of diabetes hypertension hyperlipidemia    ams  hld  htn  pna  acute infection  DM  GGO on CT - Pulm nodules on CT  Smoker  wine drinker - ROSE - Etoh use disorder    on PO ABX  on IVF  vs noted  inhaler - use and demo      ct reviewed with pt and wife  ID eval  cx - biomarkers  pt will need follow up CT imaging in 6 - 8 weeks - eval resolution of GGO and Pulm Nodules and Meds LN - in a smoker  pt will need PFT as outpatient - and Pulm follow up  smoking cess ed  bronchodilators  cough rx regimen  ROSE - education - counseling - emotional support  replete lytes  dvt p  monitor VS and Sat

## 2022-10-13 NOTE — PROGRESS NOTE ADULT - SUBJECTIVE AND OBJECTIVE BOX
Patient is a 62y old  Male who presents with a chief complaint of AMS (10 Oct 2022 09:16)    Patient seen in follow up for ADRIANNA on CKD.        PAST MEDICAL HISTORY:  Alcohol use      MEDICATIONS  (STANDING):  amLODIPine   Tablet 5 milliGRAM(s) Oral daily  atorvastatin 20 milliGRAM(s) Oral at bedtime  dextrose 5%. 1000 milliLiter(s) (100 mL/Hr) IV Continuous <Continuous>  dextrose 5%. 1000 milliLiter(s) (50 mL/Hr) IV Continuous <Continuous>  dextrose 50% Injectable 25 Gram(s) IV Push once  dextrose 50% Injectable 12.5 Gram(s) IV Push once  dextrose 50% Injectable 25 Gram(s) IV Push once  folic acid 1 milliGRAM(s) Oral daily  glucagon  Injectable 1 milliGRAM(s) IntraMuscular once  heparin   Injectable 5000 Unit(s) SubCutaneous every 8 hours  insulin glargine Injectable (LANTUS) 10 Unit(s) SubCutaneous at bedtime  insulin lispro (ADMELOG) corrective regimen sliding scale   SubCutaneous at bedtime  insulin lispro (ADMELOG) corrective regimen sliding scale   SubCutaneous three times a day before meals  insulin lispro Injectable (ADMELOG) 3 Unit(s) SubCutaneous three times a day before meals  metoprolol tartrate 25 milliGRAM(s) Oral two times a day  multivitamin 1 Tablet(s) Oral daily  sodium chloride 0.45%. 1000 milliLiter(s) (60 mL/Hr) IV Continuous <Continuous>    MEDICATIONS  (PRN):  ALBUTerol    90 MICROgram(s) HFA Inhaler 2 Puff(s) Inhalation every 6 hours PRN Shortness of Breath and/or Wheezing  dextrose Oral Gel 15 Gram(s) Oral once PRN Blood Glucose LESS THAN 70 milliGRAM(s)/deciliter  guaiFENesin Oral Liquid (Sugar-Free) 200 milliGRAM(s) Oral every 6 hours PRN Cough    T(C): 36.4 (10-13-22 @ 14:17), Max: 37.3 (10-12-22 @ 14:09)  HR: 82 (10-13-22 @ 14:17) (70 - 99)  BP: 136/75 (10-13-22 @ 14:17) (123/69 - 159/70)  RR: 17 (10-13-22 @ 14:17)  SpO2: 93% (10-13-22 @ 14:17)  Wt(kg): --  I&O's Detail    12 Oct 2022 07:01  -  13 Oct 2022 07:00  --------------------------------------------------------  IN:    Oral Fluid: 237 mL    sodium chloride 0.45%: 1140 mL  Total IN: 1377 mL    OUT:    Voided (mL): 1050 mL  Total OUT: 1050 mL    Total NET: 327 mL          LABORATORY:                        12.5   8.13  )-----------( 587      ( 13 Oct 2022 08:26 )             38.0     10-13    143  |  111<H>  |  23  ----------------------------<  147<H>  4.2   |  24  |  1.80<H>    Ca    9.7      13 Oct 2022 08:26      Sodium, Serum: 143 mmol/L (10-13 @ 08:26)  Sodium, Serum: 140 mmol/L (10-12 @ 13:20)    Potassium, Serum: 4.2 mmol/L (10-13 @ 08:26)  Potassium, Serum: 4.0 mmol/L (10-12 @ 13:20)    Hemoglobin: 12.5 g/dL (10-13 @ 08:26)  Hemoglobin: 12.2 g/dL (10-12 @ 06:43)  Hemoglobin: 12.2 g/dL (10-11 @ 07:52)    Creatinine, Serum 1.80 (10-13 @ 08:26)  Creatinine, Serum 1.90 (10-12 @ 13:20)  Creatinine, Serum 2.00 (10-11 @ 07:52)            PHYSICAL EXAM:  General: No distress  Respiratory: b/l air entry  Cardiovascular: S1 S2  Gastrointestinal: soft  Extremities:  no edema        LABORATORY:                        12.5   8.13  )-----------( 587      ( 13 Oct 2022 08:26 )             38.0     10-13    143  |  111<H>  |  23  ----------------------------<  147<H>  4.2   |  24  |  1.80<H>    Ca    9.7      13 Oct 2022 08:26      Sodium, Serum: 143 mmol/L (10-13 @ 08:26)  Sodium, Serum: 140 mmol/L (10-12 @ 13:20)    Potassium, Serum: 4.2 mmol/L (10-13 @ 08:26)  Potassium, Serum: 4.0 mmol/L (10-12 @ 13:20)    Hemoglobin: 12.5 g/dL (10-13 @ 08:26)  Hemoglobin: 12.2 g/dL (10-12 @ 06:43)  Hemoglobin: 12.2 g/dL (10-11 @ 07:52)    Creatinine, Serum 1.80 (10-13 @ 08:26)  Creatinine, Serum 1.90 (10-12 @ 13:20)  Creatinine, Serum 2.00 (10-11 @ 07:52)               Patient is a 62y old  Male who presents with a chief complaint of AMS (10 Oct 2022 09:16)    Patient seen in follow up for ADRIANNA on CKD.        PAST MEDICAL HISTORY:  Alcohol use      MEDICATIONS  (STANDING):  amLODIPine   Tablet 5 milliGRAM(s) Oral daily  atorvastatin 20 milliGRAM(s) Oral at bedtime  dextrose 5%. 1000 milliLiter(s) (100 mL/Hr) IV Continuous <Continuous>  dextrose 5%. 1000 milliLiter(s) (50 mL/Hr) IV Continuous <Continuous>  dextrose 50% Injectable 25 Gram(s) IV Push once  dextrose 50% Injectable 12.5 Gram(s) IV Push once  dextrose 50% Injectable 25 Gram(s) IV Push once  folic acid 1 milliGRAM(s) Oral daily  glucagon  Injectable 1 milliGRAM(s) IntraMuscular once  heparin   Injectable 5000 Unit(s) SubCutaneous every 8 hours  insulin glargine Injectable (LANTUS) 10 Unit(s) SubCutaneous at bedtime  insulin lispro (ADMELOG) corrective regimen sliding scale   SubCutaneous at bedtime  insulin lispro (ADMELOG) corrective regimen sliding scale   SubCutaneous three times a day before meals  insulin lispro Injectable (ADMELOG) 3 Unit(s) SubCutaneous three times a day before meals  metoprolol tartrate 25 milliGRAM(s) Oral two times a day  multivitamin 1 Tablet(s) Oral daily  sodium chloride 0.45%. 1000 milliLiter(s) (60 mL/Hr) IV Continuous <Continuous>    MEDICATIONS  (PRN):  ALBUTerol    90 MICROgram(s) HFA Inhaler 2 Puff(s) Inhalation every 6 hours PRN Shortness of Breath and/or Wheezing  dextrose Oral Gel 15 Gram(s) Oral once PRN Blood Glucose LESS THAN 70 milliGRAM(s)/deciliter  guaiFENesin Oral Liquid (Sugar-Free) 200 milliGRAM(s) Oral every 6 hours PRN Cough    T(C): 36.4 (10-13-22 @ 14:17), Max: 37.3 (10-12-22 @ 14:09)  HR: 82 (10-13-22 @ 14:17) (70 - 99)  BP: 136/75 (10-13-22 @ 14:17) (123/69 - 159/70)  RR: 17 (10-13-22 @ 14:17)  SpO2: 93% (10-13-22 @ 14:17)  Wt(kg): --  I&O's Detail    12 Oct 2022 07:01  -  13 Oct 2022 07:00  --------------------------------------------------------  IN:    Oral Fluid: 237 mL    sodium chloride 0.45%: 1140 mL  Total IN: 1377 mL    OUT:    Voided (mL): 1050 mL  Total OUT: 1050 mL    Total NET: 327 mL          LABORATORY:                        12.5   8.13  )-----------( 587      ( 13 Oct 2022 08:26 )             38.0     10-13    143  |  111<H>  |  23  ----------------------------<  147<H>  4.2   |  24  |  1.80<H>    Ca    9.7      13 Oct 2022 08:26      Sodium, Serum: 143 mmol/L (10-13 @ 08:26)  Sodium, Serum: 140 mmol/L (10-12 @ 13:20)    Potassium, Serum: 4.2 mmol/L (10-13 @ 08:26)  Potassium, Serum: 4.0 mmol/L (10-12 @ 13:20)    Hemoglobin: 12.5 g/dL (10-13 @ 08:26)  Hemoglobin: 12.2 g/dL (10-12 @ 06:43)  Hemoglobin: 12.2 g/dL (10-11 @ 07:52)    Creatinine, Serum 1.80 (10-13 @ 08:26)  Creatinine, Serum 1.90 (10-12 @ 13:20)  Creatinine, Serum 2.00 (10-11 @ 07:52)            PHYSICAL EXAM:  General: No distress  Respiratory: b/l air entry  Cardiovascular: S1 S2  Gastrointestinal: soft  Extremities:  no edema        LABORATORY:                        12.5   8.13  )-----------( 587      ( 13 Oct 2022 08:26 )             38.0     10-13    143  |  111<H>  |  23  ----------------------------<  147<H>  4.2   |  24  |  1.80<H>    Ca    9.7      13 Oct 2022 08:26      Sodium, Serum: 143 mmol/L (10-13 @ 08:26)  Sodium, Serum: 140 mmol/L (10-12 @ 13:20)    Potassium, Serum: 4.2 mmol/L (10-13 @ 08:26)  Potassium, Serum: 4.0 mmol/L (10-12 @ 13:20)    Hemoglobin: 12.5 g/dL (10-13 @ 08:26)  Hemoglobin: 12.2 g/dL (10-12 @ 06:43)  Hemoglobin: 12.2 g/dL (10-11 @ 07:52)    Creatinine, Serum 1.80 (10-13 @ 08:26)  Creatinine, Serum 1.90 (10-12 @ 13:20)  Creatinine, Serum 2.00 (10-11 @ 07:52)        < from: US Kidney and Bladder (10.09.22 @ 17:59) >    ACC: [id number] EXAM:  US KIDNEYS AND BLADDER                          PROCEDURE DATE:  10/09/2022          INTERPRETATION:  CLINICAL INFORMATION: Renal insufficiency.    COMPARISON: Abdominal CT dated 10/08/2022    TECHNIQUE: Sonography of the kidneys and bladder.    FINDINGS:  Right kidney: 11.0 cm. No suspicious renal mass, hydronephrosis or   calculi. There is 2.5 cm simple appearing cyst in the lower pole.    Left kidney: 11.0 cm. No suspicious renal mass, hydronephrosis or   calculi. There is 1.5 cm simple appearing cyst in the lower pole and 1.0   cm simple appearing cortical cyst in the upper pole.    Urinary bladder: Mild bladder wall hypertrophy. No stones or lesions   identified. Prevoid volume of 180 cc. Postvoid images were not obtained.    IMPRESSION:  No hydronephrosis.    Bilateral simple appearing renal cysts.        --- End of Report ---            KENNY STILL MD; Attending Radiologist  This document has been electronically signed. Oct  9 2022  7:28PM    < end of copied text >

## 2024-10-11 NOTE — ED ADULT TRIAGE NOTE - IDEAL BODY WEIGHT(KG)
Benefits, risks, and possible complications of procedure explained to patient/caregiver who verbalized understanding and gave verbal consent. 66

## 2025-06-25 NOTE — DISCHARGE NOTE PROVIDER - PROVIDER RX CONTACT NUMBER
Gerda Felix is calling to request a refill on the following medication(s):    Medication Request:  Requested Prescriptions     Pending Prescriptions Disp Refills    pantoprazole (PROTONIX) 40 MG tablet 90 tablet 0     Sig: Take 1 tablet by mouth daily       Last Visit Date (If Applicable):  4/21/2025    Next Visit Date:    Visit date not found              
[phone]